# Patient Record
Sex: MALE | Race: WHITE | NOT HISPANIC OR LATINO
[De-identification: names, ages, dates, MRNs, and addresses within clinical notes are randomized per-mention and may not be internally consistent; named-entity substitution may affect disease eponyms.]

---

## 2019-12-03 ENCOUNTER — TRANSCRIPTION ENCOUNTER (OUTPATIENT)
Age: 74
End: 2019-12-03

## 2020-09-25 ENCOUNTER — APPOINTMENT (OUTPATIENT)
Dept: CARDIOLOGY | Facility: CLINIC | Age: 75
End: 2020-09-25
Payer: MEDICARE

## 2020-09-25 ENCOUNTER — NON-APPOINTMENT (OUTPATIENT)
Age: 75
End: 2020-09-25

## 2020-09-25 VITALS
WEIGHT: 234 LBS | OXYGEN SATURATION: 97 % | BODY MASS INDEX: 32.76 KG/M2 | HEART RATE: 70 BPM | SYSTOLIC BLOOD PRESSURE: 104 MMHG | DIASTOLIC BLOOD PRESSURE: 76 MMHG | HEIGHT: 71 IN

## 2020-09-25 PROCEDURE — 93000 ELECTROCARDIOGRAM COMPLETE: CPT

## 2020-09-25 PROCEDURE — 99204 OFFICE O/P NEW MOD 45 MIN: CPT

## 2020-09-25 NOTE — HISTORY OF PRESENT ILLNESS
[FreeTextEntry1] : Jeremie is 74 years old and well-known to me.  He underwent coronary bypass surgery many years ago for multivessel disease.  Even postoperatively he continued to develop exertional shortness of breath with intermittent edema of his lower extremities.  He had subsequent catheterization which revealed inferior wall hypokinesia with most of the grafts patent with some distal coronary disease.\par \par Overall he is functioning well with chronic exertional shortness of breath at approximately 1 block but this is not changed for over 15 years.  He has no chest pain palpitations dizziness or syncope\par \par He does have a heart murmur compatible with mild aortic valve disease on last echo performed about 6 months ago\par \par He was recently diagnosed with polycythemia vera and has had phlebotomy and subsequently was placed on hydroxyurea.  His last hematocrit was 45.  \par \par Recent blood tests cholesterol 105 HDL 29 LDL 59 glucose 110 GFR 67 normal liver function tests

## 2020-09-25 NOTE — REASON FOR VISIT
[FreeTextEntry1] : Jeremie Tommy now 74 years old status post coronary bypass surgery chronic edema and chronic exertional shortness of breath

## 2020-09-25 NOTE — PHYSICAL EXAM
[Normal Conjunctiva] : the conjunctiva exhibited no abnormalities [Heart Sounds] : normal S1 and S2 [Respiration, Rhythm And Depth] : normal respiratory rhythm and effort [Auscultation Breath Sounds / Voice Sounds] : lungs were clear to auscultation bilaterally [Abdomen Soft] : soft [Abdomen Tenderness] : non-tender [FreeTextEntry1] : Today trace edema the lower extremities

## 2020-09-25 NOTE — DISCUSSION/SUMMARY
[FreeTextEntry1] : Jeremie is overall stable and should continue on his present regimen of medications.  He remains overweight and has gained weight during the coronavirus epidemic.  I encouraged him to decrease his caloric intake and try to exercise as much as he can.  I believe with weight loss he shortness of breath will improve\par \par At this point no further cardiac work-up is needed\par He will follow-up with me again in 3 months and in 6 months we will repeat an echocardiogram\par I will obtain the records from [facility]

## 2020-09-25 NOTE — ASSESSMENT
[FreeTextEntry1] : Jeremie he has a long history of chronic ischemic heart disease status post bypass surgery, exertional shortness of breath with mild degrees of exertion even after the bypass which has not changed for many years.  He has some element of diastolic dysfunction inferior wall hypokinesia and mild aortic stenosis on his last echocardiogram several months ago at [facility]\par \par His lipid profile is in good order\par \par 1.  Chronic ischemic heart disease status post bypass surgery exertional shortness of breath stable and unchanged\par 2.  Chronic edema of the lower extremities probably related to some degree of diastolic dysfunction on Lasix\par 3.  Polycythemia vera new diagnosis on hydroxyurea followed by Dr. Baum\par 4.  Hyperlipidemia LDL way below 70 on small dose of statin

## 2020-09-25 NOTE — REVIEW OF SYSTEMS
[Fever] : no fever [Headache] : no headache [Chills] : no chills [Shortness Of Breath] : shortness of breath [Dyspnea on exertion] : dyspnea during exertion [Chest  Pressure] : no chest pressure [Chest Pain] : no chest pain [Leg Claudication] : no intermittent leg claudication [Palpitations] : no palpitations [Cough] : no cough [Wheezing] : no wheezing [Abdominal Pain] : no abdominal pain [Heartburn] : heartburn [Dysphagia] : no dysphagia [Urinary Frequency] : urinary frequency [Joint Pain] : no joint pain [Dizziness] : no dizziness [Tremor] : no tremor was seen

## 2021-01-07 ENCOUNTER — APPOINTMENT (OUTPATIENT)
Dept: CARDIOLOGY | Facility: CLINIC | Age: 76
End: 2021-01-07
Payer: MEDICARE

## 2021-01-07 VITALS — OXYGEN SATURATION: 96 % | DIASTOLIC BLOOD PRESSURE: 68 MMHG | HEART RATE: 68 BPM | SYSTOLIC BLOOD PRESSURE: 109 MMHG

## 2021-01-07 PROCEDURE — 99072 ADDL SUPL MATRL&STAF TM PHE: CPT

## 2021-01-07 PROCEDURE — 99214 OFFICE O/P EST MOD 30 MIN: CPT

## 2021-01-07 RX ORDER — CHOLECALCIFEROL (VITAMIN D3) 10(400)/ML
5000 DROPS ORAL
Refills: 0 | Status: DISCONTINUED | COMMUNITY
End: 2021-01-07

## 2021-01-07 RX ORDER — ATENOLOL 25 MG/1
25 TABLET ORAL
Refills: 0 | Status: DISCONTINUED | COMMUNITY
End: 2021-01-07

## 2021-01-07 NOTE — PHYSICAL EXAM
[Normal Conjunctiva] : the conjunctiva exhibited no abnormalities [Respiration, Rhythm And Depth] : normal respiratory rhythm and effort [Auscultation Breath Sounds / Voice Sounds] : lungs were clear to auscultation bilaterally [Heart Sounds] : normal S1 and S2 [Abdomen Soft] : soft [Abdomen Tenderness] : non-tender [FreeTextEntry1] : Today trace edema the lower extremities

## 2021-01-07 NOTE — ASSESSMENT
[FreeTextEntry1] : Jeremie he has a long history of chronic ischemic heart disease status post bypass surgery, exertional shortness of breath with mild degrees of exertion even after the bypass which has not changed for many years.  He has some element of diastolic dysfunction inferior wall hypokinesia and mild aortic stenosis on his last echocardiogram several months ago at [facility]\par \par His lipid profile is in good order\par \par 1.  Chronic ischemic heart disease status post bypass surgery exertional shortness of breath and exertional chest pressure somewhat increased from last visit\par 2.  Chronic edema of the lower extremities probably related to some degree of diastolic dysfunction on Lasix\par 3.  Polycythemia vera new diagnosis on hydroxyurea followed by Dr. Baum\par 4.  Hyperlipidemia LDL way below 70 on small dose of statin\par \par The patient's symptoms seem to have worsened somewhat although clinically he appears stable

## 2021-01-07 NOTE — HISTORY OF PRESENT ILLNESS
[FreeTextEntry1] : Jeremie is 74 years old and well-known to me.  He underwent coronary bypass surgery many years ago for multivessel disease.  Even postoperatively he continued to develop exertional shortness of breath with intermittent edema of his lower extremities.  He had subsequent catheterization which revealed inferior wall hypokinesia with most of the grafts patent with some distal coronary disease.\par \par Overall he is functioning well with chronic exertional shortness of breath at approximately 1 block but this is not changed for over 15 years.  He has no chest pain palpitations dizziness or syncope\par \par He does have a heart murmur compatible with mild aortic valve disease on last echo performed about 6 months ago\par \par He was recently diagnosed with polycythemia vera and has had phlebotomy and subsequently was placed on hydroxyurea.  His last hematocrit was 45.  \par \par Recent blood tests cholesterol 105 HDL 29 LDL 59 glucose 110 GFR 67 normal liver function tests\par \par AG today is stable and unchanged\par Echo from 2019 revealed mild to moderate aortic stenosis with overall preserved LV function with inferior hypokinesia.

## 2021-01-07 NOTE — DISCUSSION/SUMMARY
[FreeTextEntry1] : Jeremie is overall stable and should continue on his present regimen of medications.  He remains overweight and has gained weight during the coronavirus epidemic.  I encouraged him to decrease his caloric intake and try to exercise as much as he can.  I believe with weight loss he shortness of breath will improve.  I did tell him to increase his Lasix to 40 twice daily and continue his other medications.  He does feel that Ranexa has helped somewhat in his exertional angina.  He will also continue Imdur.\par \par Routine follow with me again in 3 months\par \par

## 2021-01-07 NOTE — REASON FOR VISIT
[FreeTextEntry1] : Jeremie Sanders 75 years old was seen in follow-up cardiac consultation for evaluation of continued exertional shortness of breath edema exertional chest pressure.

## 2021-01-11 RX ORDER — POTASSIUM CHLORIDE 750 MG/1
10 TABLET, FILM COATED, EXTENDED RELEASE ORAL DAILY
Qty: 90 | Refills: 3 | Status: ACTIVE | COMMUNITY
Start: 1900-01-01 | End: 1900-01-01

## 2021-01-13 DIAGNOSIS — R42 DIZZINESS AND GIDDINESS: ICD-10-CM

## 2021-01-28 ENCOUNTER — APPOINTMENT (OUTPATIENT)
Dept: CARDIOLOGY | Facility: CLINIC | Age: 76
End: 2021-01-28
Payer: MEDICARE

## 2021-01-28 PROCEDURE — 99072 ADDL SUPL MATRL&STAF TM PHE: CPT

## 2021-01-28 PROCEDURE — 93306 TTE W/DOPPLER COMPLETE: CPT

## 2021-05-21 ENCOUNTER — NON-APPOINTMENT (OUTPATIENT)
Age: 76
End: 2021-05-21

## 2021-05-21 ENCOUNTER — APPOINTMENT (OUTPATIENT)
Dept: CARDIOLOGY | Facility: CLINIC | Age: 76
End: 2021-05-21
Payer: MEDICARE

## 2021-05-21 VITALS
DIASTOLIC BLOOD PRESSURE: 72 MMHG | HEART RATE: 72 BPM | TEMPERATURE: 97.9 F | WEIGHT: 234 LBS | BODY MASS INDEX: 32.76 KG/M2 | RESPIRATION RATE: 17 BRPM | OXYGEN SATURATION: 97 % | HEIGHT: 71 IN | SYSTOLIC BLOOD PRESSURE: 107 MMHG

## 2021-05-21 PROCEDURE — 93000 ELECTROCARDIOGRAM COMPLETE: CPT

## 2021-05-21 PROCEDURE — 99214 OFFICE O/P EST MOD 30 MIN: CPT

## 2021-05-21 PROCEDURE — 99072 ADDL SUPL MATRL&STAF TM PHE: CPT

## 2021-05-21 NOTE — HISTORY OF PRESENT ILLNESS
[FreeTextEntry1] : Jeremie is 74 years old and well-known to me.  He underwent coronary bypass surgery many years ago for multivessel disease.  Even postoperatively he continued to develop exertional shortness of breath with intermittent edema of his lower extremities.  He had subsequent catheterization which revealed inferior wall hypokinesia with most of the grafts patent with some distal coronary disease.\par \par Overall he is functioning well with chronic exertional shortness of breath at approximately 1 block but this is not changed for over 15 years.  He has no chest pain palpitations dizziness or syncope\par \par He does have a heart murmur compatible with mild aortic valve disease on last echo performed about 6 months ago\par \par He was recently diagnosed with polycythemia vera and has had phlebotomy and subsequently was placed on hydroxyurea.  His last hematocrit was 45.  \par \par Recent blood tests cholesterol 105 HDL 29 LDL 59 glucose 110 GFR 67 normal liver function tests\par \par Echo from 2019 revealed mild to moderate aortic stenosis with overall preserved LV function with inferior hypokinesia.\par \par Repeat echo 2021 March revealed low normal left ventricular function moderate aortic stenosis no pulmonary hypertension\par \par Since the last visit, he continues with exertional chest pressure and shortness of breath.  This occurs about 1-2 blocks and may be slightly less and in the past.  He has had an extensive pulmonary and repeat cardiac work-up.  He does have some distal occlusion from his bypasses but his symptoms have remained stable.  He remains overweight\par \par Recent blood work\par Creatinine 0.93 potassium 3.9 normal liver function\par Cholesterol 91 HDL 21 LDL 46 TSH 2.83\par WBC 8.5 hemoglobin 15.0 adequate 43.5 sed rate 10 hemoglobin A1c 5.5

## 2021-05-21 NOTE — REASON FOR VISIT
[FreeTextEntry1] : Jeremie Sanders 75 years old here for follow-up of his cardiac status.  He continues to have exertional shortness of breath and chest pressure with 1-2 blocks which has been present now for several years without significant change

## 2021-05-21 NOTE — ASSESSMENT
[FreeTextEntry1] : Jeremie he has a long history of chronic ischemic heart disease status post bypass surgery, exertional shortness of breath with mild degrees of exertion even after the bypass which has not changed for many years.  He has some element of diastolic dysfunction inferior wall hypokinesia and mild aortic stenosis on his last echocardiogram several months ago at [facility].  Recent echo reveals mild to moderate aortic stenosis with mild segmental LV dysfunction overall preserved ejection fraction and no pulmonary hypertension\par \par His lipid profile is in good order.  Blood tests remained stable\par \par 1.  Chronic ischemic heart disease status post bypass surgery exertional shortness of breath and exertional chest pressure somewhat increased from last visit\par 2.  Chronic edema of the lower extremities probably related to some degree of diastolic dysfunction on Lasix\par 3.  Polycythemia vera new diagnosis on hydroxyurea followed by Dr. Baum\par 4.  Hyperlipidemia LDL way below 70 on small dose of statin\par 5.  Mild to moderate aortic stenosis with mild segmental LV dysfunction\par \par The patient continues with exertional shortness of breath and chest pressure.  I was able to walk him back and forth several times and did complain of some pressure and shortness of breath.  I am not certain what the etiology is but it could be related to small vessel disease or occlusion of small vessels distal disease, some diastolic dysfunction and related to his weight.  Overall does not been any major change in his symptoms now for several years

## 2021-05-21 NOTE — DISCUSSION/SUMMARY
[FreeTextEntry1] : Patient will continue on her present regimen of medications.  I encouraged him to try to be as active as possible and try as best he can to decrease his caloric intake.  He will be going to a gym and go on the treadmill which I encouraged as well as swimming.  These things may help to improve his overall exercise tolerance\par \par Routine follow with me again in 3 months

## 2021-09-10 ENCOUNTER — RX RENEWAL (OUTPATIENT)
Age: 76
End: 2021-09-10

## 2021-09-24 ENCOUNTER — NON-APPOINTMENT (OUTPATIENT)
Age: 76
End: 2021-09-24

## 2021-09-24 ENCOUNTER — APPOINTMENT (OUTPATIENT)
Dept: CARDIOLOGY | Facility: CLINIC | Age: 76
End: 2021-09-24
Payer: MEDICARE

## 2021-09-24 VITALS
HEART RATE: 74 BPM | HEIGHT: 60 IN | WEIGHT: 228 LBS | DIASTOLIC BLOOD PRESSURE: 81 MMHG | OXYGEN SATURATION: 95 % | BODY MASS INDEX: 44.76 KG/M2 | RESPIRATION RATE: 17 BRPM | SYSTOLIC BLOOD PRESSURE: 137 MMHG

## 2021-09-24 PROCEDURE — 93000 ELECTROCARDIOGRAM COMPLETE: CPT

## 2021-09-24 PROCEDURE — 99214 OFFICE O/P EST MOD 30 MIN: CPT

## 2021-09-24 NOTE — ASSESSMENT
[FreeTextEntry1] : Jeremie he has a long history of chronic ischemic heart disease status post bypass surgery, exertional shortness of breath with mild degrees of exertion even after the bypass which has not changed for many years.  He has some element of diastolic dysfunction inferior wall hypokinesia and mild to moderate aortic stenosis on his last echocardiogram several months.\par His lipid profile is in good order.  Blood tests remained stable.  He has a new left bundle branch block\par \par 1.  Chronic ischemic heart disease status post bypass surgery exertional shortness of breath and exertional chest pressure somewhat increased from last visit\par 2.  Chronic edema of the lower extremities probably related to some degree of diastolic dysfunction on Lasix\par 3.  Polycythemia vera new diagnosis on hydroxyurea followed by Dr. Baum\par 4.  Hyperlipidemia LDL way below 70 on small dose of statin\par 5.   moderate aortic stenosis with mild segmental LV dysfunction\par 6.  New left bundle branch block with first-degree AV block etiology unclear could be related to progressive aortic stenosis or progressive coronary disease\par \par

## 2021-09-24 NOTE — DISCUSSION/SUMMARY
[FreeTextEntry1] : 1.  2D echo to reevaluate LV and RV function and the severity of the aortic stenosis this is been scheduled for early next week at [facility]\par \par 2.  Pending on the results, we may need to consider repeat cardiac catheterization\par \par 3.  To further monitor his heart rate, I suggested a Zio patch for 10 days.  He may need referral to electrophysiology.\par \par Presently his symptoms are unchanged despite the new left bundle branch block

## 2021-09-24 NOTE — HISTORY OF PRESENT ILLNESS
[FreeTextEntry1] : Jeremie is 74 years old and well-known to me.  He underwent coronary bypass surgery many years ago for multivessel disease.  Even postoperatively he continued to develop exertional shortness of breath with intermittent edema of his lower extremities.  He had subsequent catheterization which revealed inferior wall hypokinesia with most of the grafts patent with some distal coronary disease.\par \par Overall he is functioning well with chronic exertional shortness of breath at approximately 1 block but this is not changed for over 15 years.  He has no chest pain palpitations dizziness or syncope\par \par He does have a heart murmur compatible with mild aortic valve disease on last echo performed about 6 months ago\par \par He was recently diagnosed with polycythemia vera and has had phlebotomy and subsequently was placed on hydroxyurea.  His last hematocrit was 45.  \par \par blood tests last visit cholesterol 105 HDL 29 LDL 59 glucose 110 GFR 67 normal liver function tests\par \par Echo from 2019 revealed mild to moderate aortic stenosis with overall preserved LV function with inferior hypokinesia.\par \par Repeat echo 2021 January revealed low normal left ventricular function moderate aortic stenosis no pulmonary hypertension, valve area is calculated at 1.4 cm²\par \par Since the last visit, he continues with exertional chest pressure and shortness of breath.  This occurs about 1-2 blocks and may be slightly less and in the past.  He has had an extensive pulmonary and repeat cardiac work-up.  He does have some distal occlusion from his bypasses but his symptoms have remained stable.  He remains overweight\par \par Since last visit his symptoms have not changed.  He has some intermittent dizziness which is vertigo in nature.  He has exertional chest pain and exertional shortness of breath with mild degrees of exertion 1-2 blocks which has been present for many years\par \par EKG today and in his internist office however reveals normal sinus rhythm first-degree AV block with a left bundle branch block.  The left bundle branch block is noted\par \par Blood tests are unremarkable creatinine 1.12 normal liver function tests potassium 3.9 hemoglobin 15.1 hematocrit 45, HDL 25 LDL 69 total cholesterol 110\par \par \par Most recent blood work\par Creatinine 0.93 potassium 3.9 normal liver function\par Cholesterol 91 HDL 21 LDL 46 TSH 2.83\par WBC 8.5 hemoglobin 15.0 adequate 43.5 sed rate 10 hemoglobin A1c 5.5\par \par

## 2021-09-30 ENCOUNTER — OUTPATIENT (OUTPATIENT)
Dept: OUTPATIENT SERVICES | Facility: HOSPITAL | Age: 76
LOS: 1 days | End: 2021-09-30
Payer: MEDICARE

## 2021-09-30 DIAGNOSIS — Z98.89 OTHER SPECIFIED POSTPROCEDURAL STATES: Chronic | ICD-10-CM

## 2021-09-30 DIAGNOSIS — Z95.1 PRESENCE OF AORTOCORONARY BYPASS GRAFT: Chronic | ICD-10-CM

## 2021-09-30 DIAGNOSIS — I35.0 NONRHEUMATIC AORTIC (VALVE) STENOSIS: ICD-10-CM

## 2021-09-30 PROCEDURE — 93306 TTE W/DOPPLER COMPLETE: CPT | Mod: 26

## 2021-09-30 PROCEDURE — C8929: CPT

## 2021-10-04 ENCOUNTER — APPOINTMENT (OUTPATIENT)
Dept: CARDIOLOGY | Facility: CLINIC | Age: 76
End: 2021-10-04
Payer: MEDICARE

## 2021-10-04 PROCEDURE — 93242 EXT ECG>48HR<7D RECORDING: CPT

## 2021-10-07 DIAGNOSIS — K80.20 CALCULUS OF GALLBLADDER W/OUT CHOLECYSTITIS W/OUT OBSTRUCTION: ICD-10-CM

## 2021-10-08 ENCOUNTER — OUTPATIENT (OUTPATIENT)
Dept: OUTPATIENT SERVICES | Facility: HOSPITAL | Age: 76
LOS: 1 days | End: 2021-10-08
Payer: MEDICARE

## 2021-10-08 DIAGNOSIS — Z98.89 OTHER SPECIFIED POSTPROCEDURAL STATES: Chronic | ICD-10-CM

## 2021-10-08 DIAGNOSIS — Z11.52 ENCOUNTER FOR SCREENING FOR COVID-19: ICD-10-CM

## 2021-10-08 DIAGNOSIS — Z95.1 PRESENCE OF AORTOCORONARY BYPASS GRAFT: Chronic | ICD-10-CM

## 2021-10-08 LAB — SARS-COV-2 RNA SPEC QL NAA+PROBE: SIGNIFICANT CHANGE UP

## 2021-10-08 PROCEDURE — U0003: CPT

## 2021-10-08 PROCEDURE — C9803: CPT

## 2021-10-08 PROCEDURE — U0005: CPT

## 2021-10-11 ENCOUNTER — OUTPATIENT (OUTPATIENT)
Dept: OUTPATIENT SERVICES | Facility: HOSPITAL | Age: 76
LOS: 1 days | End: 2021-10-11
Payer: MEDICARE

## 2021-10-11 VITALS — HEIGHT: 71 IN | WEIGHT: 220.02 LBS

## 2021-10-11 VITALS
RESPIRATION RATE: 14 BRPM | DIASTOLIC BLOOD PRESSURE: 60 MMHG | HEART RATE: 70 BPM | OXYGEN SATURATION: 95 % | SYSTOLIC BLOOD PRESSURE: 117 MMHG

## 2021-10-11 DIAGNOSIS — Z98.89 OTHER SPECIFIED POSTPROCEDURAL STATES: Chronic | ICD-10-CM

## 2021-10-11 DIAGNOSIS — I35.0 NONRHEUMATIC AORTIC (VALVE) STENOSIS: ICD-10-CM

## 2021-10-11 DIAGNOSIS — Z95.1 PRESENCE OF AORTOCORONARY BYPASS GRAFT: Chronic | ICD-10-CM

## 2021-10-11 LAB
ANION GAP SERPL CALC-SCNC: 14 MMOL/L — SIGNIFICANT CHANGE UP (ref 5–17)
BUN SERPL-MCNC: 17 MG/DL — SIGNIFICANT CHANGE UP (ref 7–23)
CALCIUM SERPL-MCNC: 10.2 MG/DL — SIGNIFICANT CHANGE UP (ref 8.4–10.5)
CHLORIDE SERPL-SCNC: 104 MMOL/L — SIGNIFICANT CHANGE UP (ref 96–108)
CO2 SERPL-SCNC: 22 MMOL/L — SIGNIFICANT CHANGE UP (ref 22–31)
CREAT SERPL-MCNC: 0.91 MG/DL — SIGNIFICANT CHANGE UP (ref 0.5–1.3)
GLUCOSE SERPL-MCNC: 128 MG/DL — HIGH (ref 70–99)
HCT VFR BLD CALC: 42.1 % — SIGNIFICANT CHANGE UP (ref 39–50)
HGB BLD-MCNC: 14.1 G/DL — SIGNIFICANT CHANGE UP (ref 13–17)
MCHC RBC-ENTMCNC: 33.2 PG — SIGNIFICANT CHANGE UP (ref 27–34)
MCHC RBC-ENTMCNC: 33.5 GM/DL — SIGNIFICANT CHANGE UP (ref 32–36)
MCV RBC AUTO: 99.1 FL — SIGNIFICANT CHANGE UP (ref 80–100)
NRBC # BLD: 0 /100 WBCS — SIGNIFICANT CHANGE UP (ref 0–0)
PLATELET # BLD AUTO: 323 K/UL — SIGNIFICANT CHANGE UP (ref 150–400)
POTASSIUM SERPL-MCNC: 4.4 MMOL/L — SIGNIFICANT CHANGE UP (ref 3.5–5.3)
POTASSIUM SERPL-SCNC: 4.4 MMOL/L — SIGNIFICANT CHANGE UP (ref 3.5–5.3)
RBC # BLD: 4.25 M/UL — SIGNIFICANT CHANGE UP (ref 4.2–5.8)
RBC # FLD: 13.8 % — SIGNIFICANT CHANGE UP (ref 10.3–14.5)
SODIUM SERPL-SCNC: 140 MMOL/L — SIGNIFICANT CHANGE UP (ref 135–145)
WBC # BLD: 11.59 K/UL — HIGH (ref 3.8–10.5)
WBC # FLD AUTO: 11.59 K/UL — HIGH (ref 3.8–10.5)

## 2021-10-11 PROCEDURE — 99152 MOD SED SAME PHYS/QHP 5/>YRS: CPT

## 2021-10-11 PROCEDURE — 93010 ELECTROCARDIOGRAM REPORT: CPT

## 2021-10-11 PROCEDURE — 93461 R&L HRT ART/VENTRICLE ANGIO: CPT

## 2021-10-11 PROCEDURE — 80048 BASIC METABOLIC PNL TOTAL CA: CPT

## 2021-10-11 PROCEDURE — 93005 ELECTROCARDIOGRAM TRACING: CPT

## 2021-10-11 PROCEDURE — 85027 COMPLETE CBC AUTOMATED: CPT

## 2021-10-11 PROCEDURE — 93461 R&L HRT ART/VENTRICLE ANGIO: CPT | Mod: 26

## 2021-10-11 PROCEDURE — 99153 MOD SED SAME PHYS/QHP EA: CPT

## 2021-10-11 PROCEDURE — C1887: CPT

## 2021-10-11 PROCEDURE — C1769: CPT

## 2021-10-11 PROCEDURE — 36415 COLL VENOUS BLD VENIPUNCTURE: CPT

## 2021-10-11 PROCEDURE — C1894: CPT

## 2021-10-11 PROCEDURE — C1889: CPT

## 2021-10-11 RX ORDER — SODIUM CHLORIDE 9 MG/ML
3 INJECTION INTRAMUSCULAR; INTRAVENOUS; SUBCUTANEOUS EVERY 8 HOURS
Refills: 0 | Status: DISCONTINUED | OUTPATIENT
Start: 2021-10-11 | End: 2021-10-25

## 2021-10-11 RX ORDER — FUROSEMIDE 40 MG
1 TABLET ORAL
Qty: 0 | Refills: 0 | DISCHARGE

## 2021-10-11 NOTE — H&P CARDIOLOGY - NSICDXPASTMEDICALHX_GEN_ALL_CORE_FT
PAST MEDICAL HISTORY:  BPH (benign prostatic hypertrophy)     CAD (coronary artery disease)     HLD (hyperlipidemia)     HTN (hypertension)     Obesity     Peripheral edema

## 2021-10-11 NOTE — H&P CARDIOLOGY - HISTORY OF PRESENT ILLNESS
74 y/o male with PMH of HTN, HLD, CAD s/p CABG x 6 vessels (2001) with [facility] 5/2014 with LIMA to LAD (patent) and SVG to D1 (mild luminal irregularities) and SVG to RCA; dCX and D2 all occluded, Aortic Stenosis, BPH, Polycythemia Vera (had phlebotomy x 1 unable to tolerate) on Hydroxurea (managed by Dr. Baum with goal Hct <45 followed by Dr. Tellez, Cardiologist noted to have new LBBB on ECG with known first degree AVB and continues with his chronic dyspnea.  He states it affects him after walking 1 block and has a dry cough and relates it to allergies of which he takes claritin for.  His recent echo on 9/30/21 revealed LVEF 50-55% with moderate AS and basal inferior and inferolateral walls that are severely hypokinetic.  His AS has progressed from mild to moderate.  He presents today for Cardiac catheterization and is s/p COVID vaccine x 2 and his PCR is negative on 10/8/21.  He denies cp, sob presently at rest or palpitations and has dry cough with exertion and at rest but denies sick contacts, fevers or chills.

## 2021-10-11 NOTE — H&P CARDIOLOGY - NSICDXFAMILYHX_GEN_ALL_CORE_FT
FAMILY HISTORY:  Father  Still living? No  Family history of renal cancer, Age at diagnosis: Age Unknown    Mother  Still living? No  Acute myocardial infarction, Age at diagnosis: Age Unknown

## 2021-10-11 NOTE — H&P CARDIOLOGY - NSICDXPASTSURGICALHX_GEN_ALL_CORE_FT
PAST SURGICAL HISTORY:  S/P CABG x 6 8/15/2001    S/P hernia surgery inguinal hernia  repair at 10 yrs of age    S/P tonsillectomy as a child

## 2021-10-11 NOTE — ASU DISCHARGE PLAN (ADULT/PEDIATRIC) - ASU DC SPECIAL INSTRUCTIONSFT
Wound Care:   the day AFTER your procedure remove bandage GENTTLY, and clean using  mild soap and gentle warm, water stream, pat dry. leave OPEN to air. YOU MAY SHOWER   DO NOT apply lotions, creams, ointments, powder, parfumes to your incision site  DO NOT SOAK your site for 1 week ( no baths, no pools, no tubs, etc...)  Check  your groin and /or wirst daily.A small amount of bruising, and soarness are normal    ACTIVITY: for 24 hours   - DO NOT DRIVE  - DO NOT make any important decisions or sign legal documents   - DO NOT operate heavy machinaries   - you may resume sexual activity in 48 hours, unless otherwise instructed by your cardiologist     If your procedure was done through the WRIST: for the NEXT 3DAYS:  - avoid pushing, pulling, with that affected wrist   - avoid repeated movement of that hand and wrist ( eg: typing, hammering)  - DO NOT LIFT anything more than 5 lbs     If your procedure was done through the GROIN: for the NEXT 5 DAYS  - Limit climbing stairs, DO NOT soak in bathtub or pool  - no strenous activities, pushing, pulling, straining  - Do not lift anything 10lbs or heavier     MEDICATION:   take your medications as explained ( see discharge paperwork)   Follow heart healthy diet reccomended by your doctor, , if you smoke STOP SMOKING ( may call [phone] for center of tobacco control if you need assistance)     CALL your doctor to make appointment in 2 WEEKS     ***CALL YOUR DOCTOR***  if you experience: fever, chills, body aches, or severe pain, swelling, redness, heat or yellow discharge at incision site  If you experience Bleeding or excruciating pain at the procedural site, sweliing ( golf ball size) at your procedural site  If you experience CHEST PAIN  If you experience extremity numbness, tingling, temperature change ( of your procedural site)   If you are unable to reach your doctor, you may contact:   -Cardiology Office at [facility] at [phone] or   - [facility] [phone]  - [facility] [phone]

## 2021-10-11 NOTE — ASU DISCHARGE PLAN (ADULT/PEDIATRIC) - CARE PROVIDER_API CALL
Jorge Tellez (MD)  Cardiovascular Disease; Internal Medicine; Interventional Cardiology  [address] [place] [address]  Phone: [phone]  Fax: [phone]  Follow Up Time:

## 2021-10-22 ENCOUNTER — APPOINTMENT (OUTPATIENT)
Dept: ELECTROPHYSIOLOGY | Facility: CLINIC | Age: 76
End: 2021-10-22
Payer: MEDICARE

## 2021-10-22 ENCOUNTER — NON-APPOINTMENT (OUTPATIENT)
Age: 76
End: 2021-10-22

## 2021-10-22 VITALS
DIASTOLIC BLOOD PRESSURE: 77 MMHG | HEART RATE: 75 BPM | BODY MASS INDEX: 30.8 KG/M2 | RESPIRATION RATE: 14 BRPM | WEIGHT: 220 LBS | OXYGEN SATURATION: 96 % | HEIGHT: 71 IN | SYSTOLIC BLOOD PRESSURE: 124 MMHG

## 2021-10-22 PROCEDURE — 93000 ELECTROCARDIOGRAM COMPLETE: CPT

## 2021-10-22 PROCEDURE — 99205 OFFICE O/P NEW HI 60 MIN: CPT

## 2021-10-22 RX ORDER — FINASTERIDE 5 MG/1
5 TABLET, FILM COATED ORAL
Refills: 0 | Status: DISCONTINUED | COMMUNITY
End: 2021-10-22

## 2021-10-22 RX ORDER — PANTOPRAZOLE 40 MG/1
40 TABLET, DELAYED RELEASE ORAL DAILY
Qty: 90 | Refills: 3 | Status: ACTIVE | COMMUNITY
Start: 2021-03-03

## 2021-10-22 RX ORDER — FUROSEMIDE 20 MG/1
20 TABLET ORAL
Qty: 90 | Refills: 3 | Status: DISCONTINUED | COMMUNITY
End: 2021-10-22

## 2021-10-22 RX ORDER — PANTOPRAZOLE 20 MG/1
20 TABLET, DELAYED RELEASE ORAL DAILY
Qty: 90 | Refills: 2 | Status: DISCONTINUED | COMMUNITY
End: 2021-10-22

## 2021-10-26 ENCOUNTER — APPOINTMENT (OUTPATIENT)
Dept: CARDIOLOGY | Facility: CLINIC | Age: 76
End: 2021-10-26

## 2021-10-29 DIAGNOSIS — Z01.818 ENCOUNTER FOR OTHER PREPROCEDURAL EXAMINATION: ICD-10-CM

## 2021-11-02 NOTE — PHYSICAL EXAM

## 2021-11-02 NOTE — HISTORY OF PRESENT ILLNESS
[FreeTextEntry1] : 76 year old man who presents with an increase in exertional dyspnea with a history of LBBB that developed between September of 2020 and September 2021 but after May 2021. No significant new CAD based on recent cath (prevous CABG). A ZIo monitor revealed LBBB with OR prolongation. ECG today shows LBBB with a OR of 260 ms. When he exercises his OR further lengthens which would explain his exertional dyspnea. Echo shows an LVEF of 50-55% (estimated) with significant dysynchrony and moderate AS (1.4 cm2) He also has a history of syncope more suggestive of dysautonomia that goes back several years.

## 2021-11-02 NOTE — REVIEW OF SYSTEMS
[Feeling Fatigued] : feeling fatigued [SOB] : shortness of breath [Dyspnea on exertion] : dyspnea during exertion [Syncope] : syncope [Negative] : Heme/Lymph [Chest Discomfort] : no chest discomfort [Palpitations] : no palpitations [Orthopnea] : no orthopnea [Anxiety] : anxiety [Under Stress] : under stress

## 2021-11-02 NOTE — DISCUSSION/SUMMARY
[FreeTextEntry1] : While discussion his situation, he became pale and diaphoretic and briefly lost consciousness. I put the exam table back propped up his legs and he regained consciousness. I was able to palpate a faint pulse at 60 bpm while he was unconscious. The appearance was classic for neurocardiogenic syncope and after he was back to baseline, he describes he has felt similar during phlebotomy and has had spontaneous episodes. He will be best served with a CRT pacemaker (possibly dual LBB) given the new LBBB with NV prolongation. Sarcoid is a consideration but less likely and the conduction system disease is likely due to aortic root calcification from AS. Will schedule him for pacemaker implant

## 2021-11-06 ENCOUNTER — OUTPATIENT (OUTPATIENT)
Dept: OUTPATIENT SERVICES | Facility: HOSPITAL | Age: 76
LOS: 1 days | End: 2021-11-06
Payer: MEDICARE

## 2021-11-06 DIAGNOSIS — Z98.89 OTHER SPECIFIED POSTPROCEDURAL STATES: Chronic | ICD-10-CM

## 2021-11-06 DIAGNOSIS — Z95.1 PRESENCE OF AORTOCORONARY BYPASS GRAFT: Chronic | ICD-10-CM

## 2021-11-06 DIAGNOSIS — Z11.52 ENCOUNTER FOR SCREENING FOR COVID-19: ICD-10-CM

## 2021-11-06 LAB — SARS-COV-2 RNA SPEC QL NAA+PROBE: SIGNIFICANT CHANGE UP

## 2021-11-06 PROCEDURE — C9803: CPT

## 2021-11-06 PROCEDURE — U0003: CPT

## 2021-11-06 PROCEDURE — U0005: CPT

## 2021-11-09 ENCOUNTER — OUTPATIENT (OUTPATIENT)
Dept: INPATIENT UNIT | Facility: HOSPITAL | Age: 76
LOS: 1 days | End: 2021-11-09
Payer: MEDICARE

## 2021-11-09 VITALS
TEMPERATURE: 98 F | HEART RATE: 66 BPM | SYSTOLIC BLOOD PRESSURE: 145 MMHG | DIASTOLIC BLOOD PRESSURE: 84 MMHG | RESPIRATION RATE: 18 BRPM | OXYGEN SATURATION: 99 %

## 2021-11-09 VITALS
RESPIRATION RATE: 18 BRPM | HEART RATE: 71 BPM | SYSTOLIC BLOOD PRESSURE: 104 MMHG | OXYGEN SATURATION: 97 % | DIASTOLIC BLOOD PRESSURE: 74 MMHG

## 2021-11-09 DIAGNOSIS — Z90.49 ACQUIRED ABSENCE OF OTHER SPECIFIED PARTS OF DIGESTIVE TRACT: Chronic | ICD-10-CM

## 2021-11-09 DIAGNOSIS — R55 SYNCOPE AND COLLAPSE: ICD-10-CM

## 2021-11-09 DIAGNOSIS — Z98.89 OTHER SPECIFIED POSTPROCEDURAL STATES: Chronic | ICD-10-CM

## 2021-11-09 DIAGNOSIS — Z95.1 PRESENCE OF AORTOCORONARY BYPASS GRAFT: Chronic | ICD-10-CM

## 2021-11-09 DIAGNOSIS — I44.7 LEFT BUNDLE-BRANCH BLOCK, UNSPECIFIED: ICD-10-CM

## 2021-11-09 LAB
ALBUMIN SERPL ELPH-MCNC: 4.9 G/DL — SIGNIFICANT CHANGE UP (ref 3.3–5)
ALP SERPL-CCNC: 72 U/L — SIGNIFICANT CHANGE UP (ref 40–120)
ALT FLD-CCNC: 32 U/L — SIGNIFICANT CHANGE UP (ref 10–45)
ANION GAP SERPL CALC-SCNC: 13 MMOL/L — SIGNIFICANT CHANGE UP (ref 5–17)
AST SERPL-CCNC: 24 U/L — SIGNIFICANT CHANGE UP (ref 10–40)
BILIRUB SERPL-MCNC: 1.5 MG/DL — HIGH (ref 0.2–1.2)
BUN SERPL-MCNC: 20 MG/DL — SIGNIFICANT CHANGE UP (ref 7–23)
CALCIUM SERPL-MCNC: 10.3 MG/DL — SIGNIFICANT CHANGE UP (ref 8.4–10.5)
CHLORIDE SERPL-SCNC: 101 MMOL/L — SIGNIFICANT CHANGE UP (ref 96–108)
CO2 SERPL-SCNC: 27 MMOL/L — SIGNIFICANT CHANGE UP (ref 22–31)
CREAT SERPL-MCNC: 1.02 MG/DL — SIGNIFICANT CHANGE UP (ref 0.5–1.3)
GLUCOSE SERPL-MCNC: 125 MG/DL — HIGH (ref 70–99)
HCT VFR BLD CALC: 47 % — SIGNIFICANT CHANGE UP (ref 39–50)
HGB BLD-MCNC: 15.7 G/DL — SIGNIFICANT CHANGE UP (ref 13–17)
INR BLD: 1.2 RATIO — HIGH (ref 0.88–1.16)
MAGNESIUM SERPL-MCNC: 2 MG/DL — SIGNIFICANT CHANGE UP (ref 1.6–2.6)
MCHC RBC-ENTMCNC: 33.4 GM/DL — SIGNIFICANT CHANGE UP (ref 32–36)
MCHC RBC-ENTMCNC: 33.7 PG — SIGNIFICANT CHANGE UP (ref 27–34)
MCV RBC AUTO: 100.9 FL — HIGH (ref 80–100)
NRBC # BLD: 0 /100 WBCS — SIGNIFICANT CHANGE UP (ref 0–0)
PLATELET # BLD AUTO: 217 K/UL — SIGNIFICANT CHANGE UP (ref 150–400)
POTASSIUM SERPL-MCNC: 3.6 MMOL/L — SIGNIFICANT CHANGE UP (ref 3.5–5.3)
POTASSIUM SERPL-SCNC: 3.6 MMOL/L — SIGNIFICANT CHANGE UP (ref 3.5–5.3)
PROT SERPL-MCNC: 7.3 G/DL — SIGNIFICANT CHANGE UP (ref 6–8.3)
PROTHROM AB SERPL-ACNC: 14.3 SEC — HIGH (ref 10.6–13.6)
RBC # BLD: 4.66 M/UL — SIGNIFICANT CHANGE UP (ref 4.2–5.8)
RBC # FLD: 14.6 % — HIGH (ref 10.3–14.5)
SODIUM SERPL-SCNC: 141 MMOL/L — SIGNIFICANT CHANGE UP (ref 135–145)
WBC # BLD: 7.19 K/UL — SIGNIFICANT CHANGE UP (ref 3.8–10.5)
WBC # FLD AUTO: 7.19 K/UL — SIGNIFICANT CHANGE UP (ref 3.8–10.5)

## 2021-11-09 PROCEDURE — 71045 X-RAY EXAM CHEST 1 VIEW: CPT | Mod: 26

## 2021-11-09 PROCEDURE — 93010 ELECTROCARDIOGRAM REPORT: CPT | Mod: 76,77

## 2021-11-09 RX ORDER — OXYCODONE AND ACETAMINOPHEN 5; 325 MG/1; MG/1
1 TABLET ORAL EVERY 6 HOURS
Refills: 0 | Status: DISCONTINUED | OUTPATIENT
Start: 2021-11-09 | End: 2021-11-09

## 2021-11-09 RX ORDER — ACETAMINOPHEN 500 MG
1000 TABLET ORAL ONCE
Refills: 0 | Status: DISCONTINUED | OUTPATIENT
Start: 2021-11-09 | End: 2021-11-09

## 2021-11-09 RX ORDER — ACETAMINOPHEN 500 MG
1000 TABLET ORAL ONCE
Refills: 0 | Status: COMPLETED | OUTPATIENT
Start: 2021-11-09 | End: 2021-11-09

## 2021-11-09 RX ORDER — ACETAMINOPHEN 500 MG
975 TABLET ORAL ONCE
Refills: 0 | Status: COMPLETED | OUTPATIENT
Start: 2021-11-09 | End: 2021-11-09

## 2021-11-09 RX ORDER — CEPHALEXIN 500 MG
1 CAPSULE ORAL
Qty: 9 | Refills: 0
Start: 2021-11-09 | End: 2021-11-11

## 2021-11-09 RX ORDER — CEPHALEXIN 500 MG
500 CAPSULE ORAL THREE TIMES A DAY
Refills: 0 | Status: DISCONTINUED | OUTPATIENT
Start: 2021-11-09 | End: 2021-11-09

## 2021-11-09 RX ADMIN — Medication 1000 MILLIGRAM(S): at 11:05

## 2021-11-09 RX ADMIN — Medication 975 MILLIGRAM(S): at 19:24

## 2021-11-09 RX ADMIN — Medication 400 MILLIGRAM(S): at 10:48

## 2021-11-09 NOTE — ASU DISCHARGE PLAN (ADULT/PEDIATRIC) - ASU DC SPECIAL INSTRUCTIONSFT
WOUND CARE:  Do NOT scrub, rub, or pick at your incision site  AFTER 3 DAYS you may SHOWER  - use mild soap and gentle warm, water stream, pat dry  DO NOT apply lotions, creams, ointments, powder, perfumes to your incision site  DO NOT SOAK your site for 4-6 weeks ( no baths, no pools, no tubs, etc...)  wear loose clothing around site for 1-2 weeks  IF surgical tape was used DO NOT remove the strips, they will fall off after 7days, if glue was used, it will naturally fall off within 3 weeks  if staples were used, they will be removed in 7-10 days by your doctor    ACTIVITY:  for 2 weeks AFTER  your procedure  - DO NOT RAISE your arm above shoulder level ( on the same side of your incision)  for 4 weeks AFTER your procedure   - DO NOT LIFT anything 10 lbs or heavier ( on the side of your implant)   - certain activities may be limited longer, those that involve swinging your arm, and will be discussed with your EP doctor  DO NOT DRIVE until your EP Doctor or nurse practitioner/ physician assistant states it is safe to do so  A follow up appointment in 7-14 days will be arranged before your discharge    ID CARD:   you will receive an ID CARD and device company booklet   - please carry that card with you at all times    ***CALL YOUR DOCTOR ***  IF you have fever, chills, body aches, or severe pain, swelling, redness, heat, yellow drainage from your incision site  IF bleeding  or significant new swelling from your puncture site  IF your experience lightheadedness, dizziness, or fainting spell.  IF unable to ge tin contact with your doctor, you may call the Cardiology Office at [facility] at [phone]

## 2021-11-09 NOTE — H&P CARDIOLOGY - NSICDXPASTSURGICALHX_GEN_ALL_CORE_FT
PAST SURGICAL HISTORY:  S/P CABG x 6 8/15/2001    S/P cholecystectomy     S/P hernia surgery inguinal hernia  repair at 10 yrs of age    S/P tonsillectomy as a child

## 2021-11-09 NOTE — CHART NOTE - NSCHARTNOTEFT_GEN_A_CORE
11/9 s/p Metronic Dual chamber PPM; DDD , left chest wall  Site benign, VSS  Pt is alert and O x3,   Ancef 2Gm received in Lab  Plan:   CXR (p) stat,  EKG done  Keflex 500mg TID for 3 days   possible d/c home after seen by Dr. Delacruz prior to discharge

## 2021-11-09 NOTE — ASU DISCHARGE PLAN (ADULT/PEDIATRIC) - CARE PROVIDER_API CALL
Sugar Delacruz (MD; PhD)  Cardiac Electrophysiology; Cardiovascular Disease; Internal Medicine  [facility] - Dept of Cardiology, [address] [place] [address]  Phone: [phone]  Fax: [phone]  Scheduled Appointment: 11/19/2021 11:40 AM

## 2021-11-09 NOTE — H&P CARDIOLOGY - HISTORY OF PRESENT ILLNESS
77 yo M with PMhx congenital anomaly of heart, CABG,     No fever or chills ,  77 yo M with PMhx congenital anomaly of heart, CABG with increase in exertional dyspnea with a history of LBBB that developed between September 0f 2020 and September 2021 but after May 2021. No significant new CAD based on recent cath (previous CABG) . A Zlo monitor revealed LBBB with FL prolongation. FL further lengthens with exercise causing further exertional dyspnea. ECHO with EF 50-55% with significant dysynchrony and moderate AS . He has a hx of syncope suggestive of dysautonomia.      No fever or chills ,  77 yo M with PMhx congenital anomaly of heart, moderate AS  CABG with increase in exertional dyspnea with a history of LBBB that developed between September 0f 2020 and September 2021 but after May 2021. No significant new CAD based on recent cath (previous CABG) . A Zlo monitor revealed LBBB with KS prolongation. KS further lengthens with exercise causing further exertional dyspnea. ECHO with EF 50-55% with significant dysynchrony and moderate AS . He has a hx of syncope suggestive of dysautonomia.  He presents today for PPM implant. His last cardiac cath was on September 2021 .     Sept 2021 ECHO   1. Calcified trileaflet aortic valve with decreased  opening. Peak transaortic valve gradient equals 58 mm Hg,  mean transaortic valve gradient equals 30 mm Hg, estimated  aortic valve area equals 1.2 sqcm (by continuity equation),  aortic valve velocity time integral equals 88 cm, the DVI=  0.31, consistent with moderate aortic stenosis. Mild aortic  regurgitation.  2. Normal left ventricular size with mild concentric  hypertrophy.  3. There is paradoxical septal motion.  The basal inferior  and inferolateral walls are severely hypokinetic.  The left  ventricular function is overall preserved.  The LVEF=  50-55%.    No fever or chills , no N/V/D or abd pain.  77 yo M with PMhx HTN, HLD , moderate AS  CABG with increase in exertional dyspnea with a history of LBBB that developed between September 0f 2020 and September 2021 but after May 2021. No significant new CAD based on recent cath (previous CABG) . A Zlo monitor revealed LBBB with MT prolongation. MT further lengthens with exercise causing further exertional dyspnea. He also has occasional chest pressure with exertion.  ECHO with EF 50-55% with significant dysynchrony and moderate AS . He has a hx of syncope suggestive of dysautonomia.  He presents today for PPM implant. His last cardiac cath was on September 2021 .     Sept 2021 ECHO   1. Calcified trileaflet aortic valve with decreased  opening. Peak transaortic valve gradient equals 58 mm Hg,  mean transaortic valve gradient equals 30 mm Hg, estimated  aortic valve area equals 1.2 sqcm (by continuity equation),  aortic valve velocity time integral equals 88 cm, the DVI=  0.31, consistent with moderate aortic stenosis. Mild aortic  regurgitation.  2. Normal left ventricular size with mild concentric  hypertrophy.  3. There is paradoxical septal motion.  The basal inferior  and inferolateral walls are severely hypokinetic.  The left  ventricular function is overall preserved.  The LVEF=  50-55%.    No fever or chills , no N/V/D or abd pain.  77 yo M with PMhx HTN, HLD , polycythemia vera ,  moderate AS  CABG with increase in exertional dyspnea with a history of LBBB that developed between September 0f 2020 and September 2021 but after May 2021. No significant new CAD based on recent cath (previous CABG) . A Zlo monitor revealed LBBB with UT prolongation. UT further lengthens with exercise causing further exertional dyspnea. He also has occasional chest pressure with exertion.  ECHO with EF 50-55% with significant dysynchrony and moderate AS . He has a hx of syncope suggestive of dysautonomia.  He presents today for PPM implant. His last cardiac cath was on September 2021 .     Sept 2021 ECHO   1. Calcified trileaflet aortic valve with decreased  opening. Peak transaortic valve gradient equals 58 mm Hg,  mean transaortic valve gradient equals 30 mm Hg, estimated  aortic valve area equals 1.2 sqcm (by continuity equation),  aortic valve velocity time integral equals 88 cm, the DVI=  0.31, consistent with moderate aortic stenosis. Mild aortic  regurgitation.  2. Normal left ventricular size with mild concentric  hypertrophy.  3. There is paradoxical septal motion.  The basal inferior  and inferolateral walls are severely hypokinetic.  The left  ventricular function is overall preserved.  The LVEF=  50-55%.    No fever or chills , no N/V/D or abd pain.

## 2021-11-10 PROCEDURE — C1889: CPT

## 2021-11-10 PROCEDURE — C1769: CPT

## 2021-11-10 PROCEDURE — C1898: CPT

## 2021-11-10 PROCEDURE — C1785: CPT

## 2021-11-10 PROCEDURE — C1892: CPT

## 2021-11-10 PROCEDURE — 80053 COMPREHEN METABOLIC PANEL: CPT

## 2021-11-10 PROCEDURE — 83735 ASSAY OF MAGNESIUM: CPT

## 2021-11-10 PROCEDURE — 33208 INSRT HEART PM ATRIAL & VENT: CPT

## 2021-11-10 PROCEDURE — 85610 PROTHROMBIN TIME: CPT

## 2021-11-10 PROCEDURE — 93005 ELECTROCARDIOGRAM TRACING: CPT

## 2021-11-10 PROCEDURE — 71045 X-RAY EXAM CHEST 1 VIEW: CPT

## 2021-11-10 PROCEDURE — 85027 COMPLETE CBC AUTOMATED: CPT

## 2021-11-11 ENCOUNTER — RX RENEWAL (OUTPATIENT)
Age: 76
End: 2021-11-11

## 2021-11-18 ENCOUNTER — RESULT CHARGE (OUTPATIENT)
Age: 76
End: 2021-11-18

## 2021-11-19 ENCOUNTER — APPOINTMENT (OUTPATIENT)
Dept: ELECTROPHYSIOLOGY | Facility: CLINIC | Age: 76
End: 2021-11-19
Payer: MEDICARE

## 2021-11-19 ENCOUNTER — NON-APPOINTMENT (OUTPATIENT)
Age: 76
End: 2021-11-19

## 2021-11-19 VITALS
DIASTOLIC BLOOD PRESSURE: 79 MMHG | BODY MASS INDEX: 30.8 KG/M2 | WEIGHT: 220 LBS | SYSTOLIC BLOOD PRESSURE: 123 MMHG | OXYGEN SATURATION: 98 % | HEART RATE: 60 BPM | HEIGHT: 71 IN

## 2021-11-19 PROCEDURE — 93000 ELECTROCARDIOGRAM COMPLETE: CPT | Mod: 59

## 2021-11-19 PROCEDURE — 93280 PM DEVICE PROGR EVAL DUAL: CPT

## 2021-11-19 PROCEDURE — 99024 POSTOP FOLLOW-UP VISIT: CPT

## 2021-12-05 ENCOUNTER — RX RENEWAL (OUTPATIENT)
Age: 76
End: 2021-12-05

## 2021-12-15 ENCOUNTER — RX RENEWAL (OUTPATIENT)
Age: 76
End: 2021-12-15

## 2022-01-03 ENCOUNTER — APPOINTMENT (OUTPATIENT)
Dept: CARDIOLOGY | Facility: CLINIC | Age: 77
End: 2022-01-03
Payer: MEDICARE

## 2022-01-03 ENCOUNTER — NON-APPOINTMENT (OUTPATIENT)
Age: 77
End: 2022-01-03

## 2022-01-03 VITALS
SYSTOLIC BLOOD PRESSURE: 121 MMHG | HEIGHT: 71 IN | DIASTOLIC BLOOD PRESSURE: 68 MMHG | WEIGHT: 221 LBS | OXYGEN SATURATION: 97 % | HEART RATE: 62 BPM | BODY MASS INDEX: 30.94 KG/M2

## 2022-01-03 PROCEDURE — 93000 ELECTROCARDIOGRAM COMPLETE: CPT

## 2022-01-03 PROCEDURE — 99214 OFFICE O/P EST MOD 30 MIN: CPT

## 2022-01-03 NOTE — HISTORY OF PRESENT ILLNESS
[FreeTextEntry1] : Jeremie is 74 years old and well-known to me.  He underwent coronary bypass surgery many years ago for multivessel disease.  Even postoperatively he continued to develop exertional shortness of breath with intermittent edema of his lower extremities.  He had subsequent catheterization which revealed inferior wall hypokinesia with most of the grafts patent with some distal coronary disease.\par \par Overall he is functioning well with chronic exertional shortness of breath at approximately 1 block but this is not changed for over 15 years.  He has no chest pain palpitations dizziness or syncope\par \par He does have a heart murmur compatible with mild aortic valve disease on last echo performed about 6 months ago\par \par He was recently diagnosed with polycythemia vera and has had phlebotomy and subsequently was placed on hydroxyurea.  His last hematocrit was 45.  \par \par blood tests last visit cholesterol 105 HDL 29 LDL 59 glucose 110 GFR 67 normal liver function tests\par \par Echo from 2019 revealed mild to moderate aortic stenosis with overall preserved LV function with inferior hypokinesia.\par \par Repeat echo 2021 January revealed low normal left ventricular function moderate aortic stenosis no pulmonary hypertension, valve area is calculated at 1.4 cm²\par \par He continued with exertional chest pressure and shortness of breath.  This occurs about 1-2 blocks and may be slightly less and in the past.  He has had an extensive pulmonary and repeat cardiac work-up.  He does have some distal occlusion from his bypasses but his symptoms have remained stable.  He remains overweight\par On last visit his symptoms had not changed.  He has some intermittent dizziness which is vertigo in nature.  He has exertional chest pain and exertional shortness of breath with mild degrees of exertion 1-2 blocks which has been present for many years\par \par EKG his internist office however reveals normal sinus rhythm first-degree AV block with a left bundle branch block.  The left bundle branch block is noted\par \par Blood tests are unremarkable creatinine 1.12 normal liver function tests potassium 3.9 hemoglobin 15.1 hematocrit 45, HDL 25 LDL 69 total cholesterol 110\par \par \par Most recent blood work\par Creatinine 0.93 potassium 3.9 normal liver function\par Cholesterol 91 HDL 21 LDL 46 TSH 2.83\par WBC 8.5 hemoglobin 15.0 adequate 43.5 sed rate 10 hemoglobin A1c 5.5\par \par Subsequent cardiac work-up revealed no significant change in the echo with mild to moderate segmental LV dysfunction.  Subsequent cardiac catheterization revealed mild to moderate segmental LV dysfunction with moderate aortic stenosis patent LIMA to the mid LAD patent graft to the diagonal with other grafts occluded and collaterals to the distal circumflex and right coronary artery.\par \par Subsequent to this he underwent implantation of a permanent pacemaker.  Apparently insurance would not approve of a biventricular device.\par \par He also has polycythemia vera and follows with a hematologist and has blood withdrawn frequently (phlebotomy) and is on hydroxyurea\par \par Actually since the pacemaker has been inserted the patient feels his shortness of breath is markedly improved and he has less edema.  He denies chest pain.  He does have improved exercise tolerance\par \par However had a meeting with his hematologist she was told that his heart rate was slow which caused him to make a follow-up visit with me\par \par Actually his EKG today reveals 100% paced with underlying normal sinus rhythm probably first-degree AV block the heart rate is set at 60 beats a minute\par \par He does have some recurrent episodes of vertigo which are mild and he has a dry cough of unclear etiology without fever chills.  The cough is nonproductive.  He has an appoint with ENT to evaluate for possible gastroesophageal reflux disease\par \par

## 2022-01-03 NOTE — REASON FOR VISIT
[FreeTextEntry1] : Jeremie Sanders 75 years old here for follow-up of his cardiac status.  He recently developed a first-degree AV block left bundle branch block and had a subsequent pacemaker implanted at [facility].

## 2022-01-03 NOTE — DISCUSSION/SUMMARY
[FreeTextEntry1] : 1.  I reassured him that his cardiac status was stable\par \par 2.  He should continue on his present regimen of medications.\par \par 3.  I reassured her that his pacemaker was functioning normally and was set at a heart rate of 60\par \par 4.  Routine follow with me again in 3 months\par \par Overall he is clinically quite stable

## 2022-01-03 NOTE — PHYSICAL EXAM
[Normal Conjunctiva] : the conjunctiva exhibited no abnormalities [Respiration, Rhythm And Depth] : normal respiratory rhythm and effort [Auscultation Breath Sounds / Voice Sounds] : lungs were clear to auscultation bilaterally [Heart Sounds] : normal S1 and S2 [Abdomen Soft] : soft [Abdomen Tenderness] : non-tender [FreeTextEntry1] : No significant edema of the lower extremities today

## 2022-01-03 NOTE — ASSESSMENT
[FreeTextEntry1] : Thyroid seed status post bypass surgery close grafts to the circumflex and right with good collaterals with patent LIMA to the LAD and diagonal, mild to moderate aortic stenosis and mild to moderate LV dysfunction segmental in the inferior posterior wall.  He is status post pacemaker for advanced conduction disease.  Actually from a cardiac point of view he has improved since the pacemaker with less shortness of breath and less edema.  He does have a chronic dry cough.  Overall from a cardiac point of view he is extremely stable and actually improved from the last visit\par \par 1.  Status post bypass surgery patent LIMA to the LAD patent graft to the diagonal collaterals to the circumflex and right coronary artery\par \par 2.  Mild to moderate aortic stenosis stable\par \par 3.  Mild to moderate segmental LV dysfunction the inferior posterior wall\par \par 4.  Status post pacemaker for advanced conduction disease (left bundle branch block left axis deviation and first-degree AV block) pacemaker is functioning normally there is no bradycardia\par \par 5.  Recurrent episodes of vertigo improved\par \par 6.  Dry cough unclear etiology.  Lungs are clear there is no evidence of fluid overload

## 2022-01-08 ENCOUNTER — NON-APPOINTMENT (OUTPATIENT)
Age: 77
End: 2022-01-08

## 2022-01-21 ENCOUNTER — APPOINTMENT (OUTPATIENT)
Dept: OTOLARYNGOLOGY | Facility: CLINIC | Age: 77
End: 2022-01-21
Payer: MEDICARE

## 2022-01-21 VITALS
HEIGHT: 71 IN | BODY MASS INDEX: 30.94 KG/M2 | SYSTOLIC BLOOD PRESSURE: 117 MMHG | HEART RATE: 78 BPM | TEMPERATURE: 98.2 F | WEIGHT: 221 LBS | DIASTOLIC BLOOD PRESSURE: 73 MMHG

## 2022-01-21 DIAGNOSIS — R05.9 COUGH, UNSPECIFIED: ICD-10-CM

## 2022-01-21 DIAGNOSIS — K13.79 OTHER LESIONS OF ORAL MUCOSA: ICD-10-CM

## 2022-01-21 DIAGNOSIS — J34.2 DEVIATED NASAL SEPTUM: ICD-10-CM

## 2022-01-21 PROCEDURE — 31231 NASAL ENDOSCOPY DX: CPT

## 2022-01-21 PROCEDURE — 99204 OFFICE O/P NEW MOD 45 MIN: CPT | Mod: 25

## 2022-01-21 NOTE — PHYSICAL EXAM
[Nasal Endoscopy Performed] : nasal endoscopy was performed, see procedure section for findings [] : septum deviated to the right [Midline] : trachea located in midline position [Laryngoscopy Performed] : laryngoscopy was performed, see procedure section for findings [Normal] : no rashes [de-identified] : Elongated uvula

## 2022-01-21 NOTE — HISTORY OF PRESENT ILLNESS
[de-identified] : Patient complains of constant cough x 6 months. States its a constant dry non productive cough. He used to take Claritin and it would resolve the cough but now it doesn’t help him. He just started taking Robitussin which helps. Hes  had this cough for several years it was intermittent at that time, over the past 6 months its more constant. Also complains  of nasal congestion, used Flonase for several months and had no improvement. Denies tinnitus, hearing loss, vertigo. \par Hes seen several ENT doc in the past was only treated with Flonase.

## 2022-01-21 NOTE — ASSESSMENT
[FreeTextEntry1] : Patient long history of chronic dry cough.  Has underlying allergies has had this cough on and off for many years Claritin is worked in the past now nothing seems to be working he is seen for 5 other ENTs no help here for evaluation endoscopically terribly deviated septum his left nasal cavity is totally obstructed right nasal cavity is narrow fiberoptic evaluation of his nasal cavity revealed elongated uvula that is touching his epiglottis most likely the etiology of his cough I put him on a Medrol Dosepak Flonase nasal spray encouraged him to go see his allergist again to get retested for allergies and get better control of his allergies because that can also be a trigger.  He will follow-up and see us in 3 months.

## 2022-01-21 NOTE — REVIEW OF SYSTEMS
[Cough] : cough [Patient Intake Form Reviewed] : Patient intake form was reviewed [As Noted in HPI] : as noted in HPI [Negative] : Respiratory

## 2022-02-04 ENCOUNTER — NON-APPOINTMENT (OUTPATIENT)
Age: 77
End: 2022-02-04

## 2022-02-04 ENCOUNTER — APPOINTMENT (OUTPATIENT)
Dept: ELECTROPHYSIOLOGY | Facility: CLINIC | Age: 77
End: 2022-02-04
Payer: MEDICARE

## 2022-02-04 VITALS
HEART RATE: 73 BPM | BODY MASS INDEX: 30.8 KG/M2 | HEIGHT: 71 IN | WEIGHT: 220 LBS | DIASTOLIC BLOOD PRESSURE: 76 MMHG | SYSTOLIC BLOOD PRESSURE: 115 MMHG | OXYGEN SATURATION: 97 %

## 2022-02-04 PROCEDURE — 93280 PM DEVICE PROGR EVAL DUAL: CPT

## 2022-02-04 PROCEDURE — 93000 ELECTROCARDIOGRAM COMPLETE: CPT | Mod: 59

## 2022-02-15 NOTE — ASU PATIENT PROFILE, ADULT - AS SC BRADEN FRICTION
[FreeTextEntry1] : Ms. Veronica is a 45 year old female with a hx of HLD (on Lipitor), GERD, and Anxiety. 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 Pt presenting with multiple GI complaints, N/V/early satiety/daily heartburn/RUQ ab pain. Admits to excessive drinking on Friday only.\par \par 1. Liver lesion\par -MRI revealing liver lesion recommended f/u in 3 mns with MRI Eovist\par -Will obtain tumor markers with BW today\par \par 2. Fatty Liver\par -CT ab revealing fatty liver, will obtain a fibroscan to quantify steatosis/fibrosis\par -Risk factors include: Hx HLD, some risky alcohol use, BMI 26 \par - I have explained the best treatment for fatty liver is diet and exercise. I have encouraged a gradual weight loss of at least 10%. \par -I also advised fatty liver may develop into liver cancer with/without cirrhosis and therefore continued screening with labs and ab imaging is important. \par -I have encouraged a healthy lifestyle including exercising at least 3x times weekly and maintaining a diet low in carbohydrates, fat, sodium (<2gm daily) and cholesterol. \par -I have counseled pt on abstaining from alcohol and illicit drug use, avoid herbal and dietary supplements. Encouraged limit use of acetaminophen to <2gm per day and to avoid NSAIDS.    \par \par 3. HM\par -Itching: advised to try nonpharmacological remedies to assist with itching; pt to reach out to PCP regarding Derm consult.\par -HAV/HBV will check status\par - Covid vaccination completed via Pf[employer] 2021\par -GI complaints: ordered H pylori stool test. Advised to continue use of Pepcid as prescribed. Will have pt f/u with MD ALEJANDRE in regard as may benefit from EGD scoping. \par \par Plan:\par RTC 1 month with CK with Fibroscan\par BW ASAP\par MRI w/ Eovist repeat in 05/2022\par Reduction in alcohol use \par GI: H pylori stool test ASAP, continued H2 blocker use   (3) no apparent problem

## 2022-02-20 ENCOUNTER — RX RENEWAL (OUTPATIENT)
Age: 77
End: 2022-02-20

## 2022-02-23 ENCOUNTER — APPOINTMENT (OUTPATIENT)
Dept: ELECTROPHYSIOLOGY | Facility: CLINIC | Age: 77
End: 2022-02-23

## 2022-03-30 ENCOUNTER — APPOINTMENT (OUTPATIENT)
Dept: CARDIOLOGY | Facility: CLINIC | Age: 77
End: 2022-03-30
Payer: MEDICARE

## 2022-03-30 VITALS
HEART RATE: 68 BPM | HEIGHT: 71 IN | BODY MASS INDEX: 30.52 KG/M2 | DIASTOLIC BLOOD PRESSURE: 88 MMHG | WEIGHT: 218 LBS | SYSTOLIC BLOOD PRESSURE: 126 MMHG | OXYGEN SATURATION: 98 % | RESPIRATION RATE: 17 BRPM

## 2022-03-30 DIAGNOSIS — Z01.810 ENCOUNTER FOR PREPROCEDURAL CARDIOVASCULAR EXAMINATION: ICD-10-CM

## 2022-03-30 PROCEDURE — 99214 OFFICE O/P EST MOD 30 MIN: CPT

## 2022-03-30 NOTE — DISCUSSION/SUMMARY
[FreeTextEntry1] : Patient can lower his dose of Lasix from 80 mg a day to 40 as he has less edema and his shortness of breath is somewhat better\par \par Presently he is hemodynamically stable.\par There is no cardiac contraindication to the planned colonoscopy.  His pacemaker is functioning normally and his EKG is stable and normal sinus rhythm.

## 2022-03-30 NOTE — HISTORY OF PRESENT ILLNESS
[FreeTextEntry1] : Jeremie is 76 years old and well-known to me.  He underwent coronary bypass surgery many years ago for multivessel disease.  Even postoperatively he continued to develop exertional shortness of breath with intermittent edema of his lower extremities.  He had subsequent catheterization which revealed inferior wall hypokinesia with most of the grafts patent with some distal coronary disease.\par \par Overall he is functioning well with chronic exertional shortness of breath at approximately 1 block but this is not changed for over 15 years.  He has no chest pain palpitations dizziness or syncope\par \par He does have a heart murmur compatible with mild aortic valve disease on last echo performed about 6 months ago\par \par He was recently diagnosed with polycythemia vera and has had phlebotomy and subsequently was placed on hydroxyurea.  His last hematocrit was 45.  \par \par blood tests last visit cholesterol 105 HDL 29 LDL 59 glucose 110 GFR 67 normal liver function tests\par \par Echo from 2019 revealed mild to moderate aortic stenosis with overall preserved LV function with inferior hypokinesia.\par \par Repeat echo 2021 January revealed low normal left ventricular function moderate aortic stenosis no pulmonary hypertension, valve area is calculated at 1.4 cm²\par \par Patient underwent cardiac catheterization which again revealed patent LIMA to the LAD and diagonal with collaterals to the circumflex and right coronary.  This was similar to his prior catheterizations.  He had mild to moderate aortic stenosis with a mild degree of segmental LV dysfunction\par \par Overall since the pacemaker was inserted for the left bundle branch block and advanced conduction disease, he has been feeling well with less shortness of breath and has had in the past and less edema of his lower extremities.\par \par He is now preop for colonoscopy.  He remains hemodynamically stable\par \par He continued with exertional chest pressure and shortness of breath.  This occurs about 1-2 blocks and may be slightly less and in the past.  He has had an extensive pulmonary and repeat cardiac work-up.  He does have some distal occlusion from his bypasses but his symptoms have remained stable.  He remains overweight\par On last visit his symptoms had not changed.  He has some intermittent dizziness which is vertigo in nature.  He has exertional chest pain and exertional shortness of breath with mild degrees of exertion 1-2 blocks which has been present for many years\par \par EKG his internist office however reveals normal sinus rhythm first-degree AV block with a left bundle branch block.  The left bundle branch block i was new\par \par Blood tests are unremarkable creatinine 1.12 normal liver function tests potassium 3.9 hemoglobin 15.1 hematocrit 45, HDL 25 LDL 69 total cholesterol 110\par \par \par \par \par Most recent blood work\par Creatinine 0.93 potassium 3.9 normal liver function\par Cholesterol 91 HDL 21 LDL 46 TSH 2.83\par WBC 8.5 hemoglobin 15.0 adequate 43.5 sed rate 10 hemoglobin A1c 5.5\par \par Subsequent cardiac work-up revealed no significant change in the echo with mild to moderate segmental LV dysfunction.  Subsequent cardiac catheterization revealed mild to moderate segmental LV dysfunction with moderate aortic stenosis patent LIMA to the mid LAD patent graft to the diagonal with other grafts occluded and collaterals to the distal circumflex and right coronary artery.\par \par Subsequent to this he underwent implantation of a permanent pacemaker.  Apparently insurance would not approve of a biventricular device.\par \par He also has polycythemia vera and follows with a hematologist and has blood withdrawn frequently (phlebotomy) and is on hydroxyurea\par \par Actually since the pacemaker has been inserted the patient feels his shortness of breath is markedly improved and he has less edema.  He denies chest pain.  He does have improved exercise tolerance\par \par However had a meeting with his hematologist she was told that his heart rate was slow which caused him to make a follow-up visit with me\par \par Actually his EKG today reveals 100% paced with underlying normal sinus rhythm probably first-degree AV block the heart rate is set at 60 beats a minute\par \par He does have some recurrent episodes of vertigo which are mild and he has a dry cough of unclear etiology without fever chills.  The cough is nonproductive.  He has an appoint with ENT to evaluate for possible gastroesophageal reflux disease\par \par

## 2022-03-30 NOTE — ASSESSMENT
[FreeTextEntry1] : Jeremie he has a long history of chronic ischemic heart disease status post bypass surgery, exertional shortness of breath with mild degrees of exertion even after the bypass which has not changed for many years.  He has some element of diastolic dysfunction inferior wall hypokinesia and mild to moderate aortic stenosis on his last echocardiogram several months. \par \par \par 1.  Chronic ischemic heart disease status post bypass surgery exertional shortness of breath and exertional chest pressure somewhat increased from last visit\par 2.  Chronic edema of the lower extremities probably related to some degree of diastolic dysfunction on Lasix\par 3.  Polycythemia vera new diagnosis on hydroxyurea followed by Dr. Baum\par 4.  Hyperlipidemia LDL way below 70 on small dose of statin\par 5.   moderate aortic stenosis with mild segmental LV dysfunction\par 6.  Status post pacemaker for left bundle branch block.\par \par Overall since the last visit the patient has improved since the pacemaker though still has some exertional shortness of breath but his edema is less.\par

## 2022-03-30 NOTE — REASON FOR VISIT
[FreeTextEntry1] : Jeremie Sanders 76 years old here for follow-up of his cardiac status.  He recently developed a first-degree AV block left bundle branch block and had a subsequent pacemaker implanted at [facility].   He is here for preop clearance prior to undergoing colonoscopy l.

## 2022-03-30 NOTE — PHYSICAL EXAM
[Normal Conjunctiva] : the conjunctiva exhibited no abnormalities [Respiration, Rhythm And Depth] : normal respiratory rhythm and effort [Auscultation Breath Sounds / Voice Sounds] : lungs were clear to auscultation bilaterally [Heart Sounds] : normal S1 and S2 [FreeTextEntry1] : No significant edema of the lower extremities today

## 2022-04-19 RX ORDER — RIVAROXABAN 20 MG/1
20 TABLET, FILM COATED ORAL DAILY
Qty: 1 | Refills: 3 | Status: ACTIVE | COMMUNITY
Start: 2022-04-08 | End: 1900-01-01

## 2022-04-22 ENCOUNTER — APPOINTMENT (OUTPATIENT)
Dept: OTOLARYNGOLOGY | Facility: CLINIC | Age: 77
End: 2022-04-22

## 2022-05-02 ENCOUNTER — RX RENEWAL (OUTPATIENT)
Age: 77
End: 2022-05-02

## 2022-05-04 ENCOUNTER — APPOINTMENT (OUTPATIENT)
Dept: ELECTROPHYSIOLOGY | Facility: CLINIC | Age: 77
End: 2022-05-04

## 2022-05-10 ENCOUNTER — APPOINTMENT (OUTPATIENT)
Dept: ELECTROPHYSIOLOGY | Facility: CLINIC | Age: 77
End: 2022-05-10
Payer: MEDICARE

## 2022-05-10 ENCOUNTER — NON-APPOINTMENT (OUTPATIENT)
Age: 77
End: 2022-05-10

## 2022-05-10 PROCEDURE — 93296 REM INTERROG EVL PM/IDS: CPT

## 2022-05-10 PROCEDURE — 93294 REM INTERROG EVL PM/LDLS PM: CPT

## 2022-07-12 ENCOUNTER — APPOINTMENT (OUTPATIENT)
Dept: CARDIOLOGY | Facility: CLINIC | Age: 77
End: 2022-07-12

## 2022-07-12 ENCOUNTER — NON-APPOINTMENT (OUTPATIENT)
Age: 77
End: 2022-07-12

## 2022-07-12 VITALS
SYSTOLIC BLOOD PRESSURE: 140 MMHG | DIASTOLIC BLOOD PRESSURE: 78 MMHG | OXYGEN SATURATION: 97 % | HEART RATE: 67 BPM | WEIGHT: 224 LBS | HEIGHT: 71 IN | BODY MASS INDEX: 31.36 KG/M2

## 2022-07-12 DIAGNOSIS — U07.1 COVID-19: ICD-10-CM

## 2022-07-12 DIAGNOSIS — N52.9 MALE ERECTILE DYSFUNCTION, UNSPECIFIED: ICD-10-CM

## 2022-07-12 PROCEDURE — 99214 OFFICE O/P EST MOD 30 MIN: CPT

## 2022-07-12 PROCEDURE — 93000 ELECTROCARDIOGRAM COMPLETE: CPT

## 2022-07-12 RX ORDER — METHYLPREDNISOLONE 4 MG/1
4 TABLET ORAL
Qty: 1 | Refills: 0 | Status: DISCONTINUED | COMMUNITY
Start: 2022-01-21 | End: 2022-07-12

## 2022-07-12 NOTE — ASSESSMENT
[FreeTextEntry1] : Jeremie he has a long history of chronic ischemic heart disease status post bypass surgery, exertional shortness of breath with mild degrees of exertion even after the bypass which has not changed for many years.  He has some element of diastolic dysfunction inferior wall hypokinesia and mild to moderate aortic stenosis on his last echocardiogram  and status post pacemaker for new left bundle branch block and fatigue.  He had improved with less shortness of breath but recently had COVID-19 and urinary tract infection and colonoscopy with polypectomy.  He does complain of exertional chest burning.\par \par 1.  Chronic ischemic heart disease status post bypass surgery exertional shortness of breath and exertional chest pressure somewhat increased from last visit\par 2.  Chronic edema of the lower extremities probably related to some degree of diastolic dysfunction on Lasix\par 3.  Polycythemia vera new diagnosis on hydroxyurea followed by Dr. Baum\par 4.  Hyperlipidemia LDL way below 70 on small dose of statin\par 5.   moderate aortic stenosis with mild segmental LV dysfunction\par 6.    Status post pacemaker\par  7.  Recurrent onset of exertional shortness of breath and chest burning which seem to have resolved after the pacemaker.\par \par

## 2022-07-12 NOTE — REASON FOR VISIT
[FreeTextEntry1] : Jeremie Sanders 75 years old here for follow-up of his cardiac status.  He recently developed a first-degree AV block left bundle branch block and had a subsequent pacemaker implanted at [facility].

## 2022-07-12 NOTE — PHYSICAL EXAM
[Normal Conjunctiva] : the conjunctiva exhibited no abnormalities [Respiration, Rhythm And Depth] : normal respiratory rhythm and effort [Auscultation Breath Sounds / Voice Sounds] : lungs were clear to auscultation bilaterally [Heart Sounds] : normal S1 and S2 [FreeTextEntry1] :  trace to 1+ edema bilateral

## 2022-07-12 NOTE — HISTORY OF PRESENT ILLNESS
[FreeTextEntry1] : Jeremie is 74 years old and well-known to me.  He underwent coronary bypass surgery many years ago for multivessel disease.  Even postoperatively he continued to develop exertional shortness of breath with intermittent edema of his lower extremities.  He had subsequent catheterization which revealed inferior wall hypokinesia with most of the grafts patent with some distal coronary disease.\par \par Overall he is functioning well with chronic exertional shortness of breath at approximately 1 block but this is not changed for over 15 years.  He has no chest pain palpitations dizziness or syncope\par \par He does have a heart murmur compatible with mild aortic valve disease on last echo performed about 6 months ago\par \par He was recently diagnosed with polycythemia vera and has had phlebotomy and subsequently was placed on hydroxyurea.  His last hematocrit was 45.  \par \par blood tests last visit cholesterol 105 HDL 29 LDL 59 glucose 110 GFR 67 normal liver function tests\par \par Echo from 2019 revealed mild to moderate aortic stenosis with overall preserved LV function with inferior hypokinesia.\par \par Repeat echo 2021 January revealed low normal left ventricular function moderate aortic stenosis no pulmonary hypertension, valve area is calculated at 1.4 cm²\par \par He continued with exertional chest pressure and shortness of breath.  This occurs about 1-2 blocks and may be slightly less and in the past.  He has had an extensive pulmonary and repeat cardiac work-up.  He does have some distal occlusion from his bypasses but his symptoms have remained stable.  He remains overweight\par On last visit his symptoms had not changed.  He has some intermittent dizziness which is vertigo in nature.  He has exertional chest pain and exertional shortness of breath with mild degrees of exertion 1-2 blocks which has been present for many years\par \par EKG his internist office however reveals normal sinus rhythm first-degree AV block with a left bundle branch block.  The left bundle branch block is noted\par \par Blood tests are unremarkable creatinine 1.12 normal liver function tests potassium 3.9 hemoglobin 15.1 hematocrit 45, HDL 25 LDL 69 total cholesterol 110\par \par \par Most recent blood work\par Creatinine 0.93 potassium 3.9 normal liver function\par Cholesterol 91 HDL 21 LDL 46 TSH 2.83\par WBC 8.5 hemoglobin 15.0 adequate 43.5 sed rate 10 hemoglobin A1c 5.5\par \par Subsequent cardiac work-up revealed no significant change in the echo with mild to moderate segmental LV dysfunction.  Subsequent cardiac catheterization revealed mild to moderate segmental LV dysfunction with moderate aortic stenosis patent LIMA to the mid LAD patent graft to the diagonal with other grafts occluded and collaterals to the distal circumflex and right coronary artery.\par \par Subsequent to this he underwent implantation of a permanent pacemaker.  Apparently insurance would not approve of a biventricular device.\par \par He also has polycythemia vera and follows with a hematologist and has blood withdrawn frequently (phlebotomy) and is on hydroxyurea\par \par Actually since the pacemaker has been inserted the patient feels his shortness of breath is markedly improved and he has less edema.  He denies chest pain.  He does have improved exercise tolerance\par \par However had a meeting with his hematologist she was told that his heart rate was slow which caused him to make a follow-up visit with me\par \par Actually his EKG today reveals 100% paced with underlying normal sinus rhythm probably first-degree AV block the heart rate is set at 60 beats a minute\par \par He does have some recurrent episodes of vertigo which are mild and he has a dry cough of unclear etiology without fever chills.  The cough is nonproductive.  He has an appoint with ENT to evaluate for possible gastroesophageal reflux disease\par \par He had improved after the pacemaker but recently he has had a setback in his health.  He developed COVID-19, developed a cough and subsequently  developed hematuria and a urinary tract infection.  He was treated with antibiotics and eventually improved.  He had cystoscopy which did not reveal any significant pathology according to the patient,  he also has a lot of burning in his chest usually after eating but also occurring when he exerts himself.  He had colonoscopy which did reveal multiple polyps.\par \par  however since all of these events, he complains  Of exertional shortness of breath and chest burning with mild degrees of exertion.\par \par   However I did walk him back and forth and he did have an increase in heart rate and had no significant shortness of breath and her normal O2 saturation\par \par \par \par

## 2022-07-12 NOTE — DISCUSSION/SUMMARY
[FreeTextEntry1] : \par 1.  He will continue on his present regimen of medication including the Lasix\par  2.  Continue on his GI medicines for GERD symptoms\par  3.  I encouraged him to try to be active and exercise and if he develops the symptoms of chest burning he can try nitroglycerin 0.4 mg under his tongue.  He had been on Imdur in the past but it did not seem to help too much\par \par  follow-up with me again in 2 months

## 2022-07-19 ENCOUNTER — RX RENEWAL (OUTPATIENT)
Age: 77
End: 2022-07-19

## 2022-08-04 ENCOUNTER — APPOINTMENT (OUTPATIENT)
Dept: ELECTROPHYSIOLOGY | Facility: CLINIC | Age: 77
End: 2022-08-04

## 2022-08-05 ENCOUNTER — NON-APPOINTMENT (OUTPATIENT)
Age: 77
End: 2022-08-05

## 2022-08-05 ENCOUNTER — APPOINTMENT (OUTPATIENT)
Dept: ELECTROPHYSIOLOGY | Facility: CLINIC | Age: 77
End: 2022-08-05

## 2022-08-05 VITALS
RESPIRATION RATE: 14 BRPM | HEART RATE: 73 BPM | DIASTOLIC BLOOD PRESSURE: 81 MMHG | BODY MASS INDEX: 30.8 KG/M2 | WEIGHT: 220 LBS | OXYGEN SATURATION: 95 % | SYSTOLIC BLOOD PRESSURE: 121 MMHG | HEIGHT: 71 IN

## 2022-08-05 PROCEDURE — 93280 PM DEVICE PROGR EVAL DUAL: CPT

## 2022-08-05 PROCEDURE — 93000 ELECTROCARDIOGRAM COMPLETE: CPT | Mod: 59

## 2022-08-05 RX ORDER — SUCRALFATE 1 G/10ML
1 SUSPENSION ORAL
Qty: 300 | Refills: 0 | Status: DISCONTINUED | COMMUNITY
Start: 2022-04-05

## 2022-08-05 RX ORDER — FLUTICASONE PROPIONATE 50 UG/1
50 SPRAY, METERED NASAL
Qty: 1 | Refills: 5 | Status: DISCONTINUED | COMMUNITY
Start: 2022-01-21 | End: 2022-08-05

## 2022-08-05 RX ORDER — ISOSORBIDE MONONITRATE 60 MG/1
60 TABLET, EXTENDED RELEASE ORAL DAILY
Refills: 0 | Status: ACTIVE | COMMUNITY

## 2022-08-05 RX ORDER — OMEPRAZOLE MAGNESIUM 20 MG/1
20 CAPSULE, DELAYED RELEASE ORAL
Refills: 0 | Status: ACTIVE | COMMUNITY

## 2022-08-05 RX ORDER — MECLIZINE HCL 25 MG/1
25 TABLET, CHEWABLE ORAL EVERY 8 HOURS
Qty: 90 | Refills: 2 | Status: DISCONTINUED | COMMUNITY
End: 2022-08-05

## 2022-08-05 RX ORDER — AMOXICILLIN AND CLAVULANATE POTASSIUM 875; 125 MG/1; MG/1
875-125 TABLET, COATED ORAL
Qty: 20 | Refills: 0 | Status: DISCONTINUED | COMMUNITY
Start: 2022-03-28

## 2022-08-07 VITALS
RESPIRATION RATE: 14 BRPM | HEART RATE: 73 BPM | OXYGEN SATURATION: 95 % | DIASTOLIC BLOOD PRESSURE: 81 MMHG | WEIGHT: 220 LBS | HEIGHT: 71 IN | SYSTOLIC BLOOD PRESSURE: 121 MMHG | BODY MASS INDEX: 30.8 KG/M2

## 2022-08-07 NOTE — DISCUSSION/SUMMARY
[EKG obtained to assist in diagnosis and management of assessed problem(s)] : EKG obtained to assist in diagnosis and management of assessed problem(s) [FreeTextEntry1] : He continues to have exertional dyspnea despite normal LV function. No MR. The etiology of dyspnea on exertion may be due to LBBB but with a normal EF he may not qualify for CRT upgrade and there is no MR reported. I will review the echo to assess for the degree of dyssynchrony. Moderate aortic stenosis may also be in part responsible. He continues on Xarelto for PAF but has had no recent episodes. Pacemaker interrogation shows normal battery, lead sensing and function. Rare AF episodes.

## 2022-08-07 NOTE — HISTORY OF PRESENT ILLNESS
[FreeTextEntry1] :  has had no further syncope and exertional dyspnea has improved but still present. He does get dyspneic with one flight of stairs. His Paced QRS is 166 ms. He does have aortic stenosis with an TRISTAN of 1.2 cm2. LV has paradoxical motion due to pacing with an EF of 50-55% and no MR. We did not place a CRT because of baseline normal LV function.

## 2022-08-07 NOTE — REVIEW OF SYSTEMS
[SOB] : shortness of breath [Dyspnea on exertion] : dyspnea during exertion [Anxiety] : anxiety [Negative] : Heme/Lymph [Chest Discomfort] : no chest discomfort [Palpitations] : no palpitations [Orthopnea] : no orthopnea [Syncope] : no syncope [Under Stress] : not under stress

## 2022-08-07 NOTE — PHYSICAL EXAM

## 2022-08-09 ENCOUNTER — APPOINTMENT (OUTPATIENT)
Dept: ELECTROPHYSIOLOGY | Facility: CLINIC | Age: 77
End: 2022-08-09

## 2022-08-09 ENCOUNTER — NON-APPOINTMENT (OUTPATIENT)
Age: 77
End: 2022-08-09

## 2022-08-09 PROCEDURE — 93296 REM INTERROG EVL PM/IDS: CPT

## 2022-08-09 PROCEDURE — 93294 REM INTERROG EVL PM/LDLS PM: CPT

## 2022-09-06 ENCOUNTER — RX RENEWAL (OUTPATIENT)
Age: 77
End: 2022-09-06

## 2022-09-16 ENCOUNTER — APPOINTMENT (OUTPATIENT)
Dept: CARDIOLOGY | Facility: CLINIC | Age: 77
End: 2022-09-16

## 2022-09-16 VITALS
SYSTOLIC BLOOD PRESSURE: 108 MMHG | OXYGEN SATURATION: 97 % | DIASTOLIC BLOOD PRESSURE: 70 MMHG | WEIGHT: 220 LBS | BODY MASS INDEX: 30.68 KG/M2 | HEART RATE: 76 BPM

## 2022-09-16 PROCEDURE — 99214 OFFICE O/P EST MOD 30 MIN: CPT

## 2022-09-16 RX ORDER — ERYTHROMYCIN 20 MG/G
2 GEL TOPICAL
Refills: 0 | Status: DISCONTINUED | COMMUNITY
End: 2022-09-16

## 2022-09-16 NOTE — HISTORY OF PRESENT ILLNESS
[FreeTextEntry1] : Jeremie is 74 years old and well-known to me.  He underwent coronary bypass surgery many years ago for multivessel disease.  Even postoperatively he continued to develop exertional shortness of breath with intermittent edema of his lower extremities.  He had subsequent catheterization which revealed inferior wall hypokinesia with most of the grafts patent with some distal coronary disease.\par \par Overall he is functioning well with chronic exertional shortness of breath at approximately 1 block but this is not changed for over 15 years.  He has no chest pain palpitations dizziness or syncope\par \par He does have a heart murmur compatible with mild aortic valve disease on last echo performed about 6 months ago\par \par He was  diagnosed with polycythemia vera and has had phlebotomy and subsequently was placed on hydroxyurea.  His last hematocrit was 45.  \par \par blood tests  previousvisit cholesterol 105 HDL 29 LDL 59 glucose 110 GFR 67 normal liver function tests\par \par Echo from 2019 revealed mild to moderate aortic stenosis with overall preserved LV function with inferior hypokinesia.\par \par Repeat echo 2021 January revealed low normal left ventricular function moderate aortic stenosis no pulmonary hypertension, valve area is calculated at 1.4 cm²\par \par He continued with exertional chest pressure and shortness of breath.  This occurs about 1-2 blocks and may be slightly less and in the past.  He has had an extensive pulmonary and repeat cardiac work-up.  He does have some distal occlusion from his bypasses but his symptoms have remained stable.  \par \par  2021 to early 2022, vishis symptoms had not changed.  He has some intermittent dizziness which is vertigo in nature.  He has exertional chest pain and exertional shortness of breath with mild degrees of exertion 1-2 blocks which has been present for many years\par \par EKG his internist office however reveals normal sinus rhythm first-degree AV block with a left bundle branch block.  The left bundle branch block is noted\par \par Blood tests are unremarkable creatinine 1.12 normal liver function tests potassium 3.9 hemoglobin 15.1 hematocrit 45, HDL 25 LDL 69 total cholesterol 110\par \par \par t blood work- September 2021\par Creatinine 0.93 potassium 3.9 normal liver function\par Cholesterol 91 HDL 21 LDL 46 TSH 2.83\par WBC 8.5 hemoglobin 15.0 adequate 43.5 sed rate 10 hemoglobin A1c 5.5\par \par Subsequent cardiac work-up revealed no significant change in the echo with mild to moderate segmental LV dysfunction. \par \par  Subsequent cardiac catheterization  October 2021revealed mild to moderate segmental LV dysfunction with moderate aortic stenosis patent LIMA to the mid LAD patent graft to the diagonal with other grafts occluded and collaterals to the distal circumflex and right coronary artery.\par \par Subsequent to this he underwent implantation of a permanent pacemaker.  Apparently insurance would not approve of a biventricular device.\par \par He also has polycythemia vera and follows with a hematologist and has blood withdrawn frequently (phlebotomy) and is on hydroxyurea\par \par Actually  immediately after the pacemaker has been inserted the patient feels his shortness of breath is markedly improved and he has less edema.  He denies chest pain.  \par \par Actually his EKG  July 2022 reveals 100% paced with underlying normal sinus rhythm probably first-degree AV block the heart rate is set at 60 beats a minute\par \par \par \par He had improved after the pacemaker but recently he has had a setback in his health.  He developed COVID-19, developed a cough and subsequently  developed hematuria and a urinary tract infection.  He was treated with antibiotics and eventually improved.  He had cystoscopy which did not reveal any significant pathology according to the patient,  he also has a lot of burning in his chest usually after eating but also occurring when he exerts himself.  He had colonoscopy which did reveal multiple polyps.\par \par  however since all of these events, he complains  Of exertional shortness of breath and chest burning with mild degrees of exertion.\par \par  September 16, 2022-  the symptoms of shortness of breath have increased significantly.  He is unable to do vacuuming in his house or walk any distance without feeling short of breath but no chest pain.  He also has developed increasing edema of his lower extremities and his Lasix has been increased to 80 mg.  He feels that he cannot do any activity and is very concerned\par \par  I have made an appointment for him to be seen on September 20 at the valve center to evaluate progression of the aortic stenosis with an echo and visit with Dr. Bandar Berry.\par \par \par \par   However I did walk him back and forth and he did have an increase in heart rate and had no significant shortness of breath and her normal O2 saturation\par \par \par \par

## 2022-09-16 NOTE — DISCUSSION/SUMMARY
[FreeTextEntry1] : \par 1.  The patient will remain on his present regimen of medications\par  2. full blood work drawn today including BNP CBC complete metabolic profile\par  3.  Proceed with appointment with Dr. Berry  and the valve center for an echocardiogram and full evaluation of his aortic stenosis\par \par

## 2022-09-16 NOTE — REASON FOR VISIT
[FreeTextEntry1] : Jeremie Sanders 76 years old was seen in urgently  because of progressive shortness of breath with minimal exertion and increasing edema of his lower extremities.  He is scheduled for an appointment with structural heart to evaluate his aortic stenosis this coming Tuesday, September 20

## 2022-09-16 NOTE — PHYSICAL EXAM
[Normal Conjunctiva] : the conjunctiva exhibited no abnormalities [Respiration, Rhythm And Depth] : normal respiratory rhythm and effort [Auscultation Breath Sounds / Voice Sounds] : lungs were clear to auscultation bilaterally [Heart Sounds] : normal S1 and S2 [FreeTextEntry1] :  2+ pitting edema bilateral

## 2022-09-16 NOTE — ASSESSMENT
[FreeTextEntry1] : Jeremie he has a long history of chronic ischemic heart disease status post bypass surgery, exertional shortness of breath with mild degrees of exertion even after the bypass which has not changed for many years.  He has some element of diastolic dysfunction inferior wall hypokinesia and mild to moderate aortic stenosis on his last echocardiogram  and status post pacemaker for new left bundle branch block and fatigue.  He had improved with less shortness of breath but recently had COVID-19 and urinary tract infection and colonoscopy with polypectomy.   presently he has had progressive shortness of breath now with minimal exertion and edema of his lower extremities and extreme fatigue.  He did have moderate aortic stenosis on previous echo\par 1.  Chronic ischemic heart disease status post bypass surgery exertional shortness of breath and exertional chest pressure somewhat increased from last visit\par 2.  Chronic edema of the lower extremities probably related to some degree of diastolic dysfunction on Lasix\par 3.  Polycythemia vera new diagnosis on hydroxyurea followed by Dr. Baum\par 4.  Hyperlipidemia LDL way below 70 on small dose of statin\par 5.   moderate aortic stenosis with mild segmental LV dysfunction\par 6.    Status post pacemaker\par  7.   progressive exertional shortness of breath with minimal exertion and edema of the lower extremities suggestive of congestive heart failure perhaps progression of aortic stenosis.\par \par

## 2022-09-19 ENCOUNTER — RX RENEWAL (OUTPATIENT)
Age: 77
End: 2022-09-19

## 2022-09-19 LAB
ALBUMIN SERPL ELPH-MCNC: 4.8 G/DL
ALP BLD-CCNC: 67 U/L
ALT SERPL-CCNC: 51 U/L
ANION GAP SERPL CALC-SCNC: 15 MMOL/L
AST SERPL-CCNC: 36 U/L
BASOPHILS # BLD AUTO: 0.07 K/UL
BASOPHILS NFR BLD AUTO: 0.6 %
BILIRUB SERPL-MCNC: 1.3 MG/DL
BUN SERPL-MCNC: 14 MG/DL
CALCIUM SERPL-MCNC: 10 MG/DL
CHLORIDE SERPL-SCNC: 104 MMOL/L
CO2 SERPL-SCNC: 25 MMOL/L
CREAT SERPL-MCNC: 1.09 MG/DL
CRP SERPL HS-MCNC: 1.35 MG/L
EGFR: 71 ML/MIN/1.73M2
EOSINOPHIL # BLD AUTO: 0.17 K/UL
EOSINOPHIL NFR BLD AUTO: 1.5 %
GLUCOSE SERPL-MCNC: 113 MG/DL
HCT VFR BLD CALC: 48.5 %
HGB BLD-MCNC: 15.7 G/DL
IMM GRANULOCYTES NFR BLD AUTO: 1.7 %
LYMPHOCYTES # BLD AUTO: 2.3 K/UL
LYMPHOCYTES NFR BLD AUTO: 20.1 %
MAN DIFF?: NORMAL
MCHC RBC-ENTMCNC: 32.2 PG
MCHC RBC-ENTMCNC: 32.4 GM/DL
MCV RBC AUTO: 99.6 FL
MONOCYTES # BLD AUTO: 1.25 K/UL
MONOCYTES NFR BLD AUTO: 10.9 %
NEUTROPHILS # BLD AUTO: 7.46 K/UL
NEUTROPHILS NFR BLD AUTO: 65.2 %
NT-PROBNP SERPL-MCNC: 286 PG/ML
PLATELET # BLD AUTO: 220 K/UL
POTASSIUM SERPL-SCNC: 4.1 MMOL/L
PROT SERPL-MCNC: 7 G/DL
RBC # BLD: 4.87 M/UL
RBC # FLD: 13.6 %
SODIUM SERPL-SCNC: 145 MMOL/L
WBC # FLD AUTO: 11.44 K/UL

## 2022-09-20 ENCOUNTER — APPOINTMENT (OUTPATIENT)
Dept: CARDIOTHORACIC SURGERY | Facility: CLINIC | Age: 77
End: 2022-09-20

## 2022-09-20 ENCOUNTER — NON-APPOINTMENT (OUTPATIENT)
Age: 77
End: 2022-09-20

## 2022-09-20 ENCOUNTER — OUTPATIENT (OUTPATIENT)
Dept: OUTPATIENT SERVICES | Facility: HOSPITAL | Age: 77
LOS: 1 days | End: 2022-09-20
Payer: MEDICARE

## 2022-09-20 VITALS
HEIGHT: 71 IN | WEIGHT: 225 LBS | TEMPERATURE: 98 F | BODY MASS INDEX: 31.5 KG/M2 | RESPIRATION RATE: 15 BRPM | OXYGEN SATURATION: 98 % | SYSTOLIC BLOOD PRESSURE: 125 MMHG | DIASTOLIC BLOOD PRESSURE: 78 MMHG | HEART RATE: 71 BPM

## 2022-09-20 VITALS
SYSTOLIC BLOOD PRESSURE: 125 MMHG | OXYGEN SATURATION: 97 % | BODY MASS INDEX: 31.5 KG/M2 | TEMPERATURE: 97.7 F | HEIGHT: 71 IN | DIASTOLIC BLOOD PRESSURE: 78 MMHG | HEART RATE: 71 BPM | WEIGHT: 225 LBS | RESPIRATION RATE: 18 BRPM

## 2022-09-20 DIAGNOSIS — Z98.89 OTHER SPECIFIED POSTPROCEDURAL STATES: Chronic | ICD-10-CM

## 2022-09-20 DIAGNOSIS — Z95.1 PRESENCE OF AORTOCORONARY BYPASS GRAFT: Chronic | ICD-10-CM

## 2022-09-20 DIAGNOSIS — I35.0 NONRHEUMATIC AORTIC (VALVE) STENOSIS: ICD-10-CM

## 2022-09-20 DIAGNOSIS — Z82.49 FAMILY HISTORY OF ISCHEMIC HEART DISEASE AND OTHER DISEASES OF THE CIRCULATORY SYSTEM: ICD-10-CM

## 2022-09-20 DIAGNOSIS — Z80.51 FAMILY HISTORY OF MALIGNANT NEOPLASM OF KIDNEY: ICD-10-CM

## 2022-09-20 DIAGNOSIS — Z90.49 ACQUIRED ABSENCE OF OTHER SPECIFIED PARTS OF DIGESTIVE TRACT: Chronic | ICD-10-CM

## 2022-09-20 LAB
BASOPHILS # BLD AUTO: 0.06 K/UL
BASOPHILS NFR BLD AUTO: 0.5 %
EOSINOPHIL # BLD AUTO: 0.18 K/UL
EOSINOPHIL NFR BLD AUTO: 1.5 %
HCT VFR BLD CALC: 45.1 %
HGB BLD-MCNC: 15.4 G/DL
IMM GRANULOCYTES NFR BLD AUTO: 1.6 %
LYMPHOCYTES # BLD AUTO: 1.95 K/UL
LYMPHOCYTES NFR BLD AUTO: 16.6 %
MAN DIFF?: NORMAL
MCHC RBC-ENTMCNC: 32.6 PG
MCHC RBC-ENTMCNC: 34.1 GM/DL
MCV RBC AUTO: 95.6 FL
MONOCYTES # BLD AUTO: 1.45 K/UL
MONOCYTES NFR BLD AUTO: 12.4 %
NEUTROPHILS # BLD AUTO: 7.89 K/UL
NEUTROPHILS NFR BLD AUTO: 67.4 %
NT-PROBNP SERPL-MCNC: 256 PG/ML
PLATELET # BLD AUTO: 206 K/UL
RBC # BLD: 4.72 M/UL
RBC # FLD: 13.5 %
WBC # FLD AUTO: 11.72 K/UL

## 2022-09-20 PROCEDURE — 93306 TTE W/DOPPLER COMPLETE: CPT

## 2022-09-20 PROCEDURE — 99214 OFFICE O/P EST MOD 30 MIN: CPT

## 2022-09-20 PROCEDURE — 93306 TTE W/DOPPLER COMPLETE: CPT | Mod: 26

## 2022-09-20 PROCEDURE — 99205 OFFICE O/P NEW HI 60 MIN: CPT

## 2022-09-20 RX ORDER — MAG/ALUMINUM/SOD BICARB/ALGINC 20 MG-80MG
80-20 TABLET,CHEWABLE ORAL
Refills: 0 | Status: ACTIVE | COMMUNITY

## 2022-09-20 RX ORDER — HYDROXYUREA 500 MG/1
500 CAPSULE ORAL DAILY
Refills: 0 | Status: ACTIVE | COMMUNITY

## 2022-09-20 RX ORDER — ASPIRIN 81 MG
81 TABLET, DELAYED RELEASE (ENTERIC COATED) ORAL DAILY
Refills: 0 | Status: ACTIVE | COMMUNITY

## 2022-09-21 LAB
ALBUMIN SERPL ELPH-MCNC: 4.5 G/DL
ALP BLD-CCNC: 63 U/L
ALT SERPL-CCNC: 47 U/L
ANION GAP SERPL CALC-SCNC: 15 MMOL/L
AST SERPL-CCNC: 36 U/L
BILIRUB SERPL-MCNC: 0.9 MG/DL
BUN SERPL-MCNC: 15 MG/DL
CALCIUM SERPL-MCNC: 10.2 MG/DL
CHLORIDE SERPL-SCNC: 104 MMOL/L
CO2 SERPL-SCNC: 25 MMOL/L
CREAT SERPL-MCNC: 0.97 MG/DL
EGFR: 81 ML/MIN/1.73M2
GLUCOSE SERPL-MCNC: 97 MG/DL
POTASSIUM SERPL-SCNC: 4.2 MMOL/L
PROT SERPL-MCNC: 7.1 G/DL
SODIUM SERPL-SCNC: 144 MMOL/L

## 2022-09-21 NOTE — REVIEW OF SYSTEMS
[Feeling Tired] : feeling tired [SOB on Exertion] : shortness of breath during exertion [Joint Stiffness] : joint stiffness [Eye Pain] : no eye pain [Earache] : no earache [Chest Pain] : no chest pain [Palpitations] : no palpitations [Abdominal Pain] : no abdominal pain [Dysuria] : no dysuria [Skin Lesions] : no skin lesions [Dizziness] : no dizziness [Fainting] : no fainting [Anxiety] : no anxiety [Muscle Weakness] : no muscle weakness [Easy Bleeding] : no tendency for easy bleeding

## 2022-09-21 NOTE — DATA REVIEWED
[FreeTextEntry1] : Repeat echocardiogram today demonstrates severe aortic stenosis with an TRISTAN of 0.8 sqcm, peak and mean gradients of 66 and 34 mmHg, AoV 4.1 m/s, and a DVI of 0.20; mild aortic insufficiency and mildly reduced LV function were also noted (LVEF 45% with basal inferior and inferolateral hypokinesis--consistent with his history of CAD, remote IWMI, and a prior CABG). \par \par \par Echo: 9/30/21\par EF 50-55%, TRISTAN 1.2 sqcm, mGr 30 mmHg, pGr 58 mmHg \par Cardiac Cath/PCI: 10/11/21\par Coronary Angiography\par Left main artery: mild luminal disease.\par Mid left anterior descending: LIMA to mid LAD widely patent. There is a 100 % stenosis. First diagonal: present in 2017 small\par distal territroy. There is an 80 % stenosis. Lateral first diagonal: SVG to this large diagonal is widely patent. There is a 100 %\par stenosis.\par Mid circumflex: graft to distal cx occluded collaterals from graft to lateral diagonal fills the distal cx. There is a 100 % stenosis.\par First obtuse marginal: mild luminal disease SVG to OM1 is occluded.\par Proximal right coronary artery: SVG to distal RCA is occluded. There is a 100 % stenosis.\par \par Graft Angiography\par LIMA graft to Mid left anterior descending: Angiography shows no disease.\par SVG graft to Lateral first diagonal: Angiography shows minor irregularities. large vessel fills the distal cx.\par SVG graft to Distal right coronary artery: Angiography shows complete occlusion.\par SVG graft to First obtuse marginal: Angiography shows complete occlusion. the OM1 is free of disease.

## 2022-09-21 NOTE — HISTORY OF PRESENT ILLNESS
[FreeTextEntry1] : Mr. Sanders is a 76 year old male referred by Dr. Jorge Tellez for evaluation of aortic stenosis. He has a known history of moderate AS on TTE last September and reports a recent progression of exertional dyspnea with chronic chest heaviness after walking one block, fatigue and intermittent LE edema. He has no angina, PND, orthopnea, dizziness, or syncope. \par \par His past medical history includes ICM s/p CABG, AFib on Xarelto, pSVT, PPM placement (followed by Dr. Delacruz) and GERD. He was recently diagnosed with polycythemia vera and has had phlebotomy and subsequently was placed on hydroxyurea. \par \par Repeat echocardiogram today demonstrates severe aortic stenosis with an TRISTAN of 0.8 sqcm, peak and mean gradients of 66 and 34 mmHg, AoV 4.1 m/s, and a DVI of 0.20; mild aortic insufficiency and mildly reduced LV function were also noted (LVEF 45% with basal inferior and inferolateral hypokinesis--consistent with his history of CAD, remote IWMI, and a prior CABG). \par \par

## 2022-09-21 NOTE — END OF VISIT
[FreeTextEntry3] : I personally performed the services described in the documentation, reviewed the documentation recorded by the scribe in my presence and it accurately and completely records my words and actions.\par \par I, Aylin Mcfadden NP, am scribing for Dr. Daniel Martinez, the following sections HISTORY OF PRESENT ILLNESS, PAST MEDICAL/FAMILY/SOCIAL HISTORY; REVIEW OF SYSTEMS; VITAL SIGNS; PHYSICAL EXAM; DISPOSITION.\par

## 2022-09-21 NOTE — PHYSICAL EXAM
[Outer Ear] : the ears and nose were normal in appearance [Jugular Venous Distention Increased] : there was no jugular-venous distention [] : no respiratory distress [Respiration, Rhythm And Depth] : normal respiratory rhythm and effort [Auscultation Breath Sounds / Voice Sounds] : lungs were clear to auscultation bilaterally [II] : a grade 2 [___ +] : bilateral [unfilled]U+ pitting edema to the ankles [Surgical / Traumatic Scar Sternum] : a scar [Bowel Sounds] : normal bowel sounds [Abdomen Soft] : soft [Cervical Lymph Nodes Enlarged Posterior Bilaterally] : posterior cervical [Abnormal Walk] : normal gait [No Focal Deficits] : no focal deficits [Oriented To Time, Place, And Person] : oriented to person, place, and time [Impaired Insight] : insight and judgment were intact [Right Carotid Bruit] : no bruit heard over the right carotid [Left Carotid Bruit] : no bruit heard over the left carotid

## 2022-09-21 NOTE — ASSESSMENT
[FreeTextEntry1] : Mr. Sanders is a 76 year old male referred by Dr. Jorge Tellez for evaluation of aortic stenosis. He has a known history of moderate AS on TTE last September and reports a recent progression of exertional dyspnea with chronic chest heaviness after walking one block, fatigue and intermittent LE edema. He has no angina, PND, orthopnea, dizziness, or syncope. \par \par His past medical history includes ICM s/p CABG, AFib on Xarelto, pSVT, PPM placement (followed by Dr. Delacruz) and GERD. He was recently diagnosed with polycythemia vera and has had phlebotomy and subsequently was placed on hydroxyurea. \par \par Repeat echocardiogram today demonstrates severe aortic stenosis with an TRISTAN of 0.8 sqcm, peak and mean gradients of 66 and 34 mmHg, AoV 4.1 m/s, and a DVI of 0.20; mild aortic insufficiency and mildly reduced LV function were also noted (LVEF 45% with basal inferior and inferolateral hypokinesis--consistent with his history of CAD, remote IWMI, and a prior CABG). \par \par I have reviewed the patient's medical records, diagnostic images during the time of this office consultation and have made the following recommendation. Review of the imaging shows his aortic pathology has progressed and will require surgical intervention. \par \par Risks, benefits and alternatives to surgery discussed. Risks included but not limited to bleeding, risk of stroke, myocardial Infarction, kidney problems, blood transfusion, permanent pacemaker implantation, infections and death. Operative mortality and complication risks are low. Various approaches discussed in detail. \par \par All questions have been fully answered and concerns addressed. \par \par Plan:\par 1) CT SCAN \par 2) LAST CARDIAC CATH REVIEWED- NO REPEAT NEEDED\par 3) PLAN FOR TAVR\par

## 2022-09-23 ENCOUNTER — APPOINTMENT (OUTPATIENT)
Dept: CARDIOLOGY | Facility: HOSPITAL | Age: 77
End: 2022-09-23

## 2022-09-23 ENCOUNTER — APPOINTMENT (OUTPATIENT)
Dept: CARDIOLOGY | Facility: CLINIC | Age: 77
End: 2022-09-23

## 2022-09-23 ENCOUNTER — OUTPATIENT (OUTPATIENT)
Dept: OUTPATIENT SERVICES | Facility: HOSPITAL | Age: 77
LOS: 1 days | End: 2022-09-23
Payer: MEDICARE

## 2022-09-23 DIAGNOSIS — Z95.1 PRESENCE OF AORTOCORONARY BYPASS GRAFT: Chronic | ICD-10-CM

## 2022-09-23 DIAGNOSIS — Z98.89 OTHER SPECIFIED POSTPROCEDURAL STATES: Chronic | ICD-10-CM

## 2022-09-23 DIAGNOSIS — Z90.49 ACQUIRED ABSENCE OF OTHER SPECIFIED PARTS OF DIGESTIVE TRACT: Chronic | ICD-10-CM

## 2022-09-23 DIAGNOSIS — R53.83 OTHER FATIGUE: ICD-10-CM

## 2022-09-23 DIAGNOSIS — Z00.00 ENCOUNTER FOR GENERAL ADULT MEDICAL EXAMINATION WITHOUT ABNORMAL FINDINGS: ICD-10-CM

## 2022-09-23 PROCEDURE — 75574 CT ANGIO HRT W/3D IMAGE: CPT

## 2022-09-23 PROCEDURE — 75574 CT ANGIO HRT W/3D IMAGE: CPT | Mod: 26

## 2022-10-04 ENCOUNTER — RX RENEWAL (OUTPATIENT)
Age: 77
End: 2022-10-04

## 2022-10-04 ENCOUNTER — OUTPATIENT (OUTPATIENT)
Dept: OUTPATIENT SERVICES | Facility: HOSPITAL | Age: 77
LOS: 1 days | End: 2022-10-04
Payer: MEDICARE

## 2022-10-04 ENCOUNTER — APPOINTMENT (OUTPATIENT)
Dept: ULTRASOUND IMAGING | Facility: HOSPITAL | Age: 77
End: 2022-10-04

## 2022-10-04 VITALS — HEIGHT: 71 IN | WEIGHT: 225.09 LBS

## 2022-10-04 DIAGNOSIS — Z98.89 OTHER SPECIFIED POSTPROCEDURAL STATES: Chronic | ICD-10-CM

## 2022-10-04 DIAGNOSIS — Z90.49 ACQUIRED ABSENCE OF OTHER SPECIFIED PARTS OF DIGESTIVE TRACT: Chronic | ICD-10-CM

## 2022-10-04 DIAGNOSIS — Z11.52 ENCOUNTER FOR SCREENING FOR COVID-19: ICD-10-CM

## 2022-10-04 DIAGNOSIS — K21.9 GASTRO-ESOPHAGEAL REFLUX DISEASE WITHOUT ESOPHAGITIS: ICD-10-CM

## 2022-10-04 DIAGNOSIS — I35.0 NONRHEUMATIC AORTIC (VALVE) STENOSIS: ICD-10-CM

## 2022-10-04 DIAGNOSIS — I25.10 ATHEROSCLEROTIC HEART DISEASE OF NATIVE CORONARY ARTERY WITHOUT ANGINA PECTORIS: ICD-10-CM

## 2022-10-04 DIAGNOSIS — I48.91 UNSPECIFIED ATRIAL FIBRILLATION: ICD-10-CM

## 2022-10-04 DIAGNOSIS — Z95.0 PRESENCE OF CARDIAC PACEMAKER: Chronic | ICD-10-CM

## 2022-10-04 DIAGNOSIS — Z95.1 PRESENCE OF AORTOCORONARY BYPASS GRAFT: Chronic | ICD-10-CM

## 2022-10-04 DIAGNOSIS — Z29.9 ENCOUNTER FOR PROPHYLACTIC MEASURES, UNSPECIFIED: ICD-10-CM

## 2022-10-04 LAB
A1C WITH ESTIMATED AVERAGE GLUCOSE RESULT: 5.4 % — SIGNIFICANT CHANGE UP (ref 4–5.6)
BLD GP AB SCN SERPL QL: NEGATIVE — SIGNIFICANT CHANGE UP
ESTIMATED AVERAGE GLUCOSE: 108 MG/DL — SIGNIFICANT CHANGE UP (ref 68–114)
MRSA PCR RESULT.: SIGNIFICANT CHANGE UP
RH IG SCN BLD-IMP: POSITIVE — SIGNIFICANT CHANGE UP
S AUREUS DNA NOSE QL NAA+PROBE: SIGNIFICANT CHANGE UP
SARS-COV-2 RNA SPEC QL NAA+PROBE: SIGNIFICANT CHANGE UP

## 2022-10-04 PROCEDURE — 87640 STAPH A DNA AMP PROBE: CPT

## 2022-10-04 PROCEDURE — U0005: CPT

## 2022-10-04 PROCEDURE — 86901 BLOOD TYPING SEROLOGIC RH(D): CPT

## 2022-10-04 PROCEDURE — U0003: CPT

## 2022-10-04 PROCEDURE — C9803: CPT

## 2022-10-04 PROCEDURE — 83036 HEMOGLOBIN GLYCOSYLATED A1C: CPT

## 2022-10-04 PROCEDURE — 86923 COMPATIBILITY TEST ELECTRIC: CPT

## 2022-10-04 PROCEDURE — 86850 RBC ANTIBODY SCREEN: CPT

## 2022-10-04 PROCEDURE — 87641 MR-STAPH DNA AMP PROBE: CPT

## 2022-10-04 PROCEDURE — 93880 EXTRACRANIAL BILAT STUDY: CPT | Mod: 26

## 2022-10-04 PROCEDURE — 86900 BLOOD TYPING SEROLOGIC ABO: CPT

## 2022-10-04 PROCEDURE — G0463: CPT

## 2022-10-04 PROCEDURE — 71046 X-RAY EXAM CHEST 2 VIEWS: CPT

## 2022-10-04 PROCEDURE — 71046 X-RAY EXAM CHEST 2 VIEWS: CPT | Mod: 26

## 2022-10-04 PROCEDURE — 93880 EXTRACRANIAL BILAT STUDY: CPT

## 2022-10-04 PROCEDURE — 36415 COLL VENOUS BLD VENIPUNCTURE: CPT

## 2022-10-04 RX ORDER — TAMSULOSIN HYDROCHLORIDE 0.4 MG/1
1 CAPSULE ORAL
Qty: 0 | Refills: 0 | DISCHARGE

## 2022-10-04 RX ORDER — FUROSEMIDE 40 MG
1 TABLET ORAL
Qty: 0 | Refills: 0 | DISCHARGE

## 2022-10-04 RX ORDER — CEFUROXIME AXETIL 250 MG
1500 TABLET ORAL ONCE
Refills: 0 | Status: DISCONTINUED | OUTPATIENT
Start: 2022-10-06 | End: 2022-10-07

## 2022-10-04 RX ORDER — ACETAMINOPHEN 500 MG
2 TABLET ORAL
Qty: 0 | Refills: 0 | DISCHARGE

## 2022-10-04 RX ORDER — ISOSORBIDE MONONITRATE 60 MG/1
1 TABLET, EXTENDED RELEASE ORAL
Qty: 0 | Refills: 0 | DISCHARGE

## 2022-10-04 RX ORDER — ASPIRIN/CALCIUM CARB/MAGNESIUM 324 MG
1 TABLET ORAL
Qty: 0 | Refills: 0 | DISCHARGE

## 2022-10-04 NOTE — H&P PST ADULT - NSANTHOSAYNRD_GEN_A_CORE
No. TIA screening performed.  STOP BANG Legend: 0-2 = LOW Risk; 3-4 = INTERMEDIATE Risk; 5-8 = HIGH Risk

## 2022-10-04 NOTE — H&P PST ADULT - PROBLEM SELECTOR PLAN 1
scheduled TAVR on 10/6/22  -preop instructions done  -Labs: CBC, CMP in chart from 9/20/22, A1c, T&S, MRSA done in PST  -Cxray, B/L Carotid US today  -last PPM interrogation in chart

## 2022-10-04 NOTE — H&P PST ADULT - NSICDXPASTSURGICALHX_GEN_ALL_CORE_FT
PAST SURGICAL HISTORY:  Cardiac pacemaker 2021    S/P CABG x 6 8/15/2002    S/P cholecystectomy     S/P hernia surgery inguinal hernia  repair at 10 yrs of age    S/P tonsillectomy as a child

## 2022-10-04 NOTE — H&P PST ADULT - ASSESSMENT
RICHARDI VTE 2.0 SCORE [CLOT updated 2019]    AGE RELATED RISK FACTORS                                                       MOBILITY RELATED FACTORS  [ ] Age 41-60 years                                            (1 Point)                    [ ] Bed rest                                                        (1 Point)  [ ] Age: 61-74 years                                           (2 Points)                  [ ] Plaster cast                                                   (2 Points)  [ ] Age= 75 years                                              (3 Points)                    [ ] Bed bound for more than 72 hours                 (2 Points)    DISEASE RELATED RISK FACTORS                                               GENDER SPECIFIC FACTORS  [ ] Edema in the lower extremities                       (1 Point)              [ ] Pregnancy                                                     (1 Point)  [ ] Varicose veins                                               (1 Point)                     [ ] Post-partum < 6 weeks                                   (1 Point)             [ ] BMI > 25 Kg/m2                                            (1 Point)                     [ ] Hormonal therapy  or oral contraception          (1 Point)                 [ ] Sepsis (in the previous month)                        (1 Point)               [ ] History of pregnancy complications                 (1 point)  [ ] Pneumonia or serious lung disease                                               [ ] Unexplained or recurrent                     (1 Point)           (in the previous month)                               (1 Point)  [ ] Abnormal pulmonary function test                     (1 Point)                 SURGERY RELATED RISK FACTORS  [ ] Acute myocardial infarction                              (1 Point)               [ ]  Section                                             (1 Point)  [ ] Congestive heart failure (in the previous month)  (1 Point)      [ ] Minor surgery                                                  (1 Point)   [ ] Inflammatory bowel disease                             (1 Point)               [ ] Arthroscopic surgery                                        (2 Points)  [ ] Central venous access                                      (2 Points)                [ ] General surgery lasting more than 45 minutes (2 points)  [ ] Malignancy- Present or previous                   (2 Points)                [ ] Elective arthroplasty                                         (5 points)    [ ] Stroke (in the previous month)                          (5 Points)                                                                                                                                                           HEMATOLOGY RELATED FACTORS                                                 TRAUMA RELATED RISK FACTORS  [ ] Prior episodes of VTE                                     (3 Points)                [ ] Fracture of the hip, pelvis, or leg                       (5 Points)  [ ] Positive family history for VTE                         (3 Points)             [ ] Acute spinal cord injury (in the previous month)  (5 Points)  [ ] Prothrombin 85943 A                                     (3 Points)               [ ] Paralysis  (less than 1 month)                             (5 Points)  [ ] Factor V Leiden                                             (3 Points)                  [ ] Multiple Trauma within 1 month                        (5 Points)  [ ] Lupus anticoagulants                                     (3 Points)                                                           [ ] Anticardiolipin antibodies                               (3 Points)                                                       [ ] High homocysteine in the blood                      (3 Points)                                             [ ] Other congenital or acquired thrombophilia      (3 Points)                                                [ ] Heparin induced thrombocytopenia                  (3 Points)                                     Total Score [          ] RICHARDI VTE 2.0 SCORE [CLOT updated 2019]    AGE RELATED RISK FACTORS                                                       MOBILITY RELATED FACTORS  [ ] Age 41-60 years                                            (1 Point)                    [ ] Bed rest                                                        (1 Point)  [ ] Age: 61-74 years                                           (2 Points)                  [ ] Plaster cast                                                   (2 Points)  [ ] Age= 75 years                                              (3 Points)                    [ ] Bed bound for more than 72 hours                 (2 Points)    DISEASE RELATED RISK FACTORS                                               GENDER SPECIFIC FACTORS  [ ] Edema in the lower extremities                       (1 Point)              [ ] Pregnancy                                                     (1 Point)  [ ] Varicose veins                                               (1 Point)                     [ ] Post-partum < 6 weeks                                   (1 Point)             [ ] BMI > 25 Kg/m2                                            (1 Point)                     [ ] Hormonal therapy  or oral contraception          (1 Point)                 [ ] Sepsis (in the previous month)                        (1 Point)               [ ] History of pregnancy complications                 (1 point)  [ ] Pneumonia or serious lung disease                                               [ ] Unexplained or recurrent                     (1 Point)           (in the previous month)                               (1 Point)  [ ] Abnormal pulmonary function test                     (1 Point)                 SURGERY RELATED RISK FACTORS  [ ] Acute myocardial infarction                              (1 Point)               [ ]  Section                                             (1 Point)  [ ] Congestive heart failure (in the previous month)  (1 Point)      [ ] Minor surgery                                                  (1 Point)   [ ] Inflammatory bowel disease                             (1 Point)               [ ] Arthroscopic surgery                                        (2 Points)  [ ] Central venous access                                      (2 Points)                [ ] General surgery lasting more than 45 minutes (2 points)  [ ] Malignancy- Present or previous                   (2 Points)                [ ] Elective arthroplasty                                         (5 points)    [ ] Stroke (in the previous month)                          (5 Points)                                                                                                                                                           HEMATOLOGY RELATED FACTORS                                                 TRAUMA RELATED RISK FACTORS  [ ] Prior episodes of VTE                                     (3 Points)                [ ] Fracture of the hip, pelvis, or leg                       (5 Points)  [ ] Positive family history for VTE                         (3 Points)             [ ] Acute spinal cord injury (in the previous month)  (5 Points)  [ ] Prothrombin 76010 A                                     (3 Points)               [ ] Paralysis  (less than 1 month)                             (5 Points)  [ ] Factor V Leiden                                             (3 Points)                  [ ] Multiple Trauma within 1 month                        (5 Points)  [ ] Lupus anticoagulants                                     (3 Points)                                                           [ ] Anticardiolipin antibodies                               (3 Points)                                                       [ ] High homocysteine in the blood                      (3 Points)                                             [ ] Other congenital or acquired thrombophilia      (3 Points)                                                [ ] Heparin induced thrombocytopenia                  (3 Points)                                     Total Score [   4       ]

## 2022-10-04 NOTE — H&P PST ADULT - NSICDXPASTMEDICALHX_GEN_ALL_CORE_FT
PAST MEDICAL HISTORY:  Afib     AS (aortic stenosis)     BPH (benign prostatic hypertrophy)     CAD (coronary artery disease)     COVID-19 4/11/2022: aysmptomatic    HLD (hyperlipidemia)     HTN (hypertension)     Obesity     Peripheral edema

## 2022-10-04 NOTE — H&P PST ADULT - HISTORY OF PRESENT ILLNESS
75 yo M with PMhx HTN, HLD , polycythemia vera ,  moderate AS  CABG with increase in exertional dyspnea with a history of LBBB that developed between September 0f 2020 and September 2021 but after May 2021. No significant new CAD based on recent cath (previous CABG) . A Zlo monitor revealed LBBB with KS prolongation. KS further lengthens with exercise causing further exertional dyspnea. He also has occasional chest pressure with exertion.  ECHO with EF 50-55% with significant dysynchrony and moderate AS . He has a hx of syncope suggestive of dysautonomia.  He presents today for PPM implant. His last cardiac cath was on September 2021 .     Sept 2021 ECHO   1. Calcified trileaflet aortic valve with decreased  opening. Peak transaortic valve gradient equals 58 mm Hg,  mean transaortic valve gradient equals 30 mm Hg, estimated  aortic valve area equals 1.2 sqcm (by continuity equation),  aortic valve velocity time integral equals 88 cm, the DVI=  0.31, consistent with moderate aortic stenosis. Mild aortic  regurgitation.  2. Normal left ventricular size with mild concentric  hypertrophy.  3. There is paradoxical septal motion.  The basal inferior  and inferolateral walls are severely hypokinetic.  The left  ventricular function is overall preserved.  The LVEF=  50-55%.    No fever or chills , no N/V/D or abd pain.  77 yo M with PMhx HTN, HLD , polycythemia vera, moderate AS, CABG x6, LBBB, Afib (Xarelto), PPM (11/2021), Covid (Asymptomatic 4/2022). Pt reports JOEL and intermittent c/p on exertion for several weeks, with minor peripheral edema. Pt denies headaches at this time. Pt evaluated by Dr. Martinez/Jamal for a scheduled TAVR on 10/6/22. Pt denies recent travel, sick contact, or recent covid infection.    Covid swab done on 10/4 at [facility]

## 2022-10-04 NOTE — H&P PST ADULT - FALL HARM RISK - UNIVERSAL INTERVENTIONS
Bed in lowest position, wheels locked, appropriate side rails in place/Call bell, personal items and telephone in reach/Instruct patient to call for assistance before getting out of bed or chair/Non-slip footwear when patient is out of bed/[place] to call system/Physically safe environment - no spills, clutter or unnecessary equipment/Purposeful Proactive Rounding/Room/bathroom lighting operational, light cord in reach

## 2022-10-05 ENCOUNTER — TRANSCRIPTION ENCOUNTER (OUTPATIENT)
Age: 77
End: 2022-10-05

## 2022-10-05 PROBLEM — I35.0 NONRHEUMATIC AORTIC (VALVE) STENOSIS: Chronic | Status: ACTIVE | Noted: 2022-10-04

## 2022-10-05 PROBLEM — U07.1 COVID-19: Chronic | Status: ACTIVE | Noted: 2022-10-04

## 2022-10-05 PROBLEM — I48.91 UNSPECIFIED ATRIAL FIBRILLATION: Chronic | Status: ACTIVE | Noted: 2022-10-04

## 2022-10-06 ENCOUNTER — APPOINTMENT (OUTPATIENT)
Dept: CARDIOTHORACIC SURGERY | Facility: HOSPITAL | Age: 77
End: 2022-10-06

## 2022-10-06 ENCOUNTER — TRANSCRIPTION ENCOUNTER (OUTPATIENT)
Age: 77
End: 2022-10-06

## 2022-10-06 ENCOUNTER — INPATIENT (INPATIENT)
Facility: HOSPITAL | Age: 77
LOS: 0 days | Discharge: HOME | DRG: 267 | End: 2022-10-07
Attending: THORACIC SURGERY (CARDIOTHORACIC VASCULAR SURGERY) | Admitting: THORACIC SURGERY (CARDIOTHORACIC VASCULAR SURGERY)
Payer: MEDICARE

## 2022-10-06 VITALS
TEMPERATURE: 98 F | DIASTOLIC BLOOD PRESSURE: 83 MMHG | HEART RATE: 68 BPM | HEIGHT: 71 IN | RESPIRATION RATE: 18 BRPM | OXYGEN SATURATION: 97 % | WEIGHT: 234.57 LBS | SYSTOLIC BLOOD PRESSURE: 133 MMHG

## 2022-10-06 DIAGNOSIS — Z98.89 OTHER SPECIFIED POSTPROCEDURAL STATES: Chronic | ICD-10-CM

## 2022-10-06 DIAGNOSIS — I35.0 NONRHEUMATIC AORTIC (VALVE) STENOSIS: ICD-10-CM

## 2022-10-06 DIAGNOSIS — Z90.49 ACQUIRED ABSENCE OF OTHER SPECIFIED PARTS OF DIGESTIVE TRACT: Chronic | ICD-10-CM

## 2022-10-06 DIAGNOSIS — Z95.0 PRESENCE OF CARDIAC PACEMAKER: Chronic | ICD-10-CM

## 2022-10-06 DIAGNOSIS — Z95.1 PRESENCE OF AORTOCORONARY BYPASS GRAFT: Chronic | ICD-10-CM

## 2022-10-06 LAB — GLUCOSE BLDC GLUCOMTR-MCNC: 133 MG/DL — HIGH (ref 70–99)

## 2022-10-06 PROCEDURE — 33361 REPLACE AORTIC VALVE PERQ: CPT | Mod: 62,Q0

## 2022-10-06 PROCEDURE — 33361 REPLACE AORTIC VALVE PERQ: CPT | Mod: Q0,62

## 2022-10-06 PROCEDURE — 93010 ELECTROCARDIOGRAM REPORT: CPT | Mod: 77

## 2022-10-06 PROCEDURE — 93306 TTE W/DOPPLER COMPLETE: CPT | Mod: 26

## 2022-10-06 PROCEDURE — 93010 ELECTROCARDIOGRAM REPORT: CPT

## 2022-10-06 DEVICE — GWIRE GUID  0.035INX150CM: Type: IMPLANTABLE DEVICE | Status: FUNCTIONAL

## 2022-10-06 DEVICE — WIRE GD SAFARI2 275CM XSML CRV: Type: IMPLANTABLE DEVICE | Status: FUNCTIONAL

## 2022-10-06 DEVICE — GUIDEWIRE AMPLATZ SUPER-STIFF SHORT TAPER .035" X 260CM: Type: IMPLANTABLE DEVICE | Status: FUNCTIONAL

## 2022-10-06 DEVICE — MANTA VASC CLOS 18FR: Type: IMPLANTABLE DEVICE | Status: FUNCTIONAL

## 2022-10-06 DEVICE — SHEATH INTRODUCER TERUMO PINNACLE CORONARY 8FR X 10CM X 0.038" MINI WIRE: Type: IMPLANTABLE DEVICE | Status: FUNCTIONAL

## 2022-10-06 DEVICE — CATH VASCULAR EXPO VENTRICULAR PIGTAIL CURVE (PIG) 0.045" X 5FR X 100CM: Type: IMPLANTABLE DEVICE | Status: FUNCTIONAL

## 2022-10-06 DEVICE — CATH VASCULAR EXPO EXPO AMPLATZ LEFT CURVE (AL1) 0.045" X 5FR X 100CM: Type: IMPLANTABLE DEVICE | Status: FUNCTIONAL

## 2022-10-06 DEVICE — VALVE TAVR EVOLUT FX 29MM: Type: IMPLANTABLE DEVICE | Status: FUNCTIONAL

## 2022-10-06 DEVICE — INTRODUCER SHEATH DRYSEAL FLEX 18FR X 33CM: Type: IMPLANTABLE DEVICE | Status: FUNCTIONAL

## 2022-10-06 DEVICE — CATH VASCULAR EXPO VENTRICULAR PIGTAIL CURVE (PIG 145) 0.045" X 5FR X 10CM: Type: IMPLANTABLE DEVICE | Status: FUNCTIONAL

## 2022-10-06 DEVICE — WIRE GUIDE EXCHANGE J TIP 3MM X 260CM: Type: IMPLANTABLE DEVICE | Status: FUNCTIONAL

## 2022-10-06 DEVICE — LOADING SYSTEM EVOLUT FX 23-29MM: Type: IMPLANTABLE DEVICE | Status: FUNCTIONAL

## 2022-10-06 DEVICE — PACER KT BLLN FLW DIR 5FR: Type: IMPLANTABLE DEVICE | Status: FUNCTIONAL

## 2022-10-06 DEVICE — SHEATH INTRODUCER TERUMO PINNACLE CORONARY 6FR X 10CM X 0.038" MINI WIRE: Type: IMPLANTABLE DEVICE | Status: FUNCTIONAL

## 2022-10-06 DEVICE — GWIRE STR .038X180 STIFF LONG TAPR: Type: IMPLANTABLE DEVICE | Status: FUNCTIONAL

## 2022-10-06 DEVICE — SUT PERCLOSE PROGLIDE 6FR: Type: IMPLANTABLE DEVICE | Status: FUNCTIONAL

## 2022-10-06 DEVICE — CATH VASCULAR EXPO FEMORAL LEFT CURVE (FL4) 0.045" X 5FR X 100CM: Type: IMPLANTABLE DEVICE | Status: FUNCTIONAL

## 2022-10-06 DEVICE — BLLN Z-MED II 23MMX4X100CM: Type: IMPLANTABLE DEVICE | Status: FUNCTIONAL

## 2022-10-06 DEVICE — CATH TAVR EVOLUT FX 14FR 23-29MM: Type: IMPLANTABLE DEVICE | Status: FUNCTIONAL

## 2022-10-06 RX ORDER — FINASTERIDE 5 MG/1
5 TABLET, FILM COATED ORAL DAILY
Refills: 0 | Status: DISCONTINUED | OUTPATIENT
Start: 2022-10-06 | End: 2022-10-07

## 2022-10-06 RX ORDER — TAMSULOSIN HYDROCHLORIDE 0.4 MG/1
0.4 CAPSULE ORAL AT BEDTIME
Refills: 0 | Status: DISCONTINUED | OUTPATIENT
Start: 2022-10-06 | End: 2022-10-07

## 2022-10-06 RX ORDER — MECLIZINE HCL 12.5 MG
1 TABLET ORAL
Qty: 0 | Refills: 0 | DISCHARGE

## 2022-10-06 RX ORDER — FOLIC ACID 0.8 MG
1 TABLET ORAL AT BEDTIME
Refills: 0 | Status: DISCONTINUED | OUTPATIENT
Start: 2022-10-06 | End: 2022-10-07

## 2022-10-06 RX ORDER — NITROGLYCERIN 6.5 MG
0.4 CAPSULE, EXTENDED RELEASE ORAL
Qty: 0 | Refills: 0 | DISCHARGE

## 2022-10-06 RX ORDER — ACETAMINOPHEN 500 MG
650 TABLET ORAL EVERY 6 HOURS
Refills: 0 | Status: DISCONTINUED | OUTPATIENT
Start: 2022-10-06 | End: 2022-10-07

## 2022-10-06 RX ORDER — LIDOCAINE HCL 20 MG/ML
0.2 VIAL (ML) INJECTION ONCE
Refills: 0 | Status: DISCONTINUED | OUTPATIENT
Start: 2022-10-06 | End: 2022-10-06

## 2022-10-06 RX ORDER — ALUMINUM HYDROXIDE AND MAGNESIUM TRISILICATE 80; 14.2 MG/1; MG/1
2 TABLET, CHEWABLE ORAL
Qty: 0 | Refills: 0 | DISCHARGE

## 2022-10-06 RX ORDER — SODIUM CHLORIDE 9 MG/ML
3 INJECTION INTRAMUSCULAR; INTRAVENOUS; SUBCUTANEOUS EVERY 8 HOURS
Refills: 0 | Status: DISCONTINUED | OUTPATIENT
Start: 2022-10-06 | End: 2022-10-06

## 2022-10-06 RX ORDER — FINASTERIDE 5 MG/1
1 TABLET, FILM COATED ORAL
Qty: 0 | Refills: 0 | DISCHARGE

## 2022-10-06 RX ORDER — CEFUROXIME AXETIL 250 MG
1500 TABLET ORAL EVERY 8 HOURS
Refills: 0 | Status: COMPLETED | OUTPATIENT
Start: 2022-10-06 | End: 2022-10-06

## 2022-10-06 RX ORDER — ASPIRIN/CALCIUM CARB/MAGNESIUM 324 MG
81 TABLET ORAL DAILY
Refills: 0 | Status: DISCONTINUED | OUTPATIENT
Start: 2022-10-06 | End: 2022-10-07

## 2022-10-06 RX ORDER — RIVAROXABAN 15 MG-20MG
20 KIT ORAL DAILY
Refills: 0 | Status: DISCONTINUED | OUTPATIENT
Start: 2022-10-07 | End: 2022-10-07

## 2022-10-06 RX ORDER — ATENOLOL 25 MG/1
12.5 TABLET ORAL DAILY
Refills: 0 | Status: DISCONTINUED | OUTPATIENT
Start: 2022-10-06 | End: 2022-10-07

## 2022-10-06 RX ORDER — CHOLECALCIFEROL (VITAMIN D3) 125 MCG
1 CAPSULE ORAL
Qty: 0 | Refills: 0 | DISCHARGE

## 2022-10-06 RX ORDER — ISOSORBIDE MONONITRATE 60 MG/1
60 TABLET, EXTENDED RELEASE ORAL DAILY
Refills: 0 | Status: DISCONTINUED | OUTPATIENT
Start: 2022-10-06 | End: 2022-10-07

## 2022-10-06 RX ORDER — HYDROXYUREA 500 MG/1
1 CAPSULE ORAL
Qty: 0 | Refills: 0 | DISCHARGE

## 2022-10-06 RX ORDER — ISOSORBIDE MONONITRATE 60 MG/1
1 TABLET, EXTENDED RELEASE ORAL
Qty: 0 | Refills: 0 | DISCHARGE

## 2022-10-06 RX ORDER — SIMVASTATIN 20 MG/1
1 TABLET, FILM COATED ORAL
Qty: 0 | Refills: 0 | DISCHARGE

## 2022-10-06 RX ORDER — PANTOPRAZOLE SODIUM 20 MG/1
40 TABLET, DELAYED RELEASE ORAL
Refills: 0 | Status: DISCONTINUED | OUTPATIENT
Start: 2022-10-06 | End: 2022-10-07

## 2022-10-06 RX ORDER — SIMVASTATIN 20 MG/1
10 TABLET, FILM COATED ORAL AT BEDTIME
Refills: 0 | Status: DISCONTINUED | OUTPATIENT
Start: 2022-10-06 | End: 2022-10-07

## 2022-10-06 RX ORDER — FAMOTIDINE 10 MG/ML
1 INJECTION INTRAVENOUS
Qty: 0 | Refills: 0 | DISCHARGE

## 2022-10-06 RX ORDER — HYDROXYUREA 500 MG/1
500 CAPSULE ORAL DAILY
Refills: 0 | Status: DISCONTINUED | OUTPATIENT
Start: 2022-10-06 | End: 2022-10-07

## 2022-10-06 RX ORDER — FAMOTIDINE 10 MG/ML
40 INJECTION INTRAVENOUS DAILY
Refills: 0 | Status: DISCONTINUED | OUTPATIENT
Start: 2022-10-06 | End: 2022-10-07

## 2022-10-06 RX ORDER — ASPIRIN/CALCIUM CARB/MAGNESIUM 324 MG
1 TABLET ORAL
Qty: 0 | Refills: 0 | DISCHARGE

## 2022-10-06 RX ORDER — POTASSIUM CHLORIDE 20 MEQ
10 PACKET (EA) ORAL DAILY
Refills: 0 | Status: DISCONTINUED | OUTPATIENT
Start: 2022-10-06 | End: 2022-10-07

## 2022-10-06 RX ORDER — CHLORHEXIDINE GLUCONATE 213 G/1000ML
1 SOLUTION TOPICAL ONCE
Refills: 0 | Status: DISCONTINUED | OUTPATIENT
Start: 2022-10-06 | End: 2022-10-06

## 2022-10-06 RX ORDER — RIVAROXABAN 15 MG-20MG
1 KIT ORAL
Qty: 0 | Refills: 0 | DISCHARGE

## 2022-10-06 RX ORDER — POTASSIUM CHLORIDE 20 MEQ
1 PACKET (EA) ORAL
Qty: 0 | Refills: 0 | DISCHARGE

## 2022-10-06 RX ORDER — PANTOPRAZOLE SODIUM 20 MG/1
1 TABLET, DELAYED RELEASE ORAL
Qty: 0 | Refills: 0 | DISCHARGE

## 2022-10-06 RX ORDER — TAMSULOSIN HYDROCHLORIDE 0.4 MG/1
1 CAPSULE ORAL
Qty: 0 | Refills: 0 | DISCHARGE

## 2022-10-06 RX ORDER — FOLIC ACID 0.8 MG
1 TABLET ORAL
Qty: 0 | Refills: 0 | DISCHARGE

## 2022-10-06 RX ADMIN — Medication 650 MILLIGRAM(S): at 19:56

## 2022-10-06 RX ADMIN — Medication 650 MILLIGRAM(S): at 20:26

## 2022-10-06 RX ADMIN — SIMVASTATIN 10 MILLIGRAM(S): 20 TABLET, FILM COATED ORAL at 19:35

## 2022-10-06 RX ADMIN — Medication 10 MILLIEQUIVALENT(S): at 19:35

## 2022-10-06 RX ADMIN — Medication 100 MILLIGRAM(S): at 23:27

## 2022-10-06 RX ADMIN — PANTOPRAZOLE SODIUM 40 MILLIGRAM(S): 20 TABLET, DELAYED RELEASE ORAL at 12:30

## 2022-10-06 RX ADMIN — ISOSORBIDE MONONITRATE 60 MILLIGRAM(S): 60 TABLET, EXTENDED RELEASE ORAL at 19:35

## 2022-10-06 RX ADMIN — ATENOLOL 12.5 MILLIGRAM(S): 25 TABLET ORAL at 19:56

## 2022-10-06 RX ADMIN — HYDROXYUREA 500 MILLIGRAM(S): 500 CAPSULE ORAL at 12:30

## 2022-10-06 RX ADMIN — Medication 100 MILLIGRAM(S): at 16:15

## 2022-10-06 RX ADMIN — Medication 81 MILLIGRAM(S): at 18:55

## 2022-10-06 RX ADMIN — FAMOTIDINE 40 MILLIGRAM(S): 10 INJECTION INTRAVENOUS at 12:30

## 2022-10-06 RX ADMIN — Medication 1 MILLIGRAM(S): at 19:35

## 2022-10-06 NOTE — CHART NOTE - NSCHARTNOTEFT_GEN_A_CORE
s/p TAVR Medtronic Evolute    Transfemoral  18 Fr Right Manta arterial closure  Left 6 Fr arterial perclose  Left 8 Fr venous- manual hold    Zinacef given at 8am: 2 more doses  ASA today  Xarelto 10/7/2022  lasix restart? discuss with Dr Berry or TAVR in the AM    Anesthesia:  conscious sedation  fent/ precedex

## 2022-10-06 NOTE — BRIEF OPERATIVE NOTE - COMMENTS
29 Medtronic TAVR valve, well seated on ECHO  R fem arterial closure with Mantra device  L fem venous held with manual pressure

## 2022-10-06 NOTE — HISTORY OF PRESENT ILLNESS
[FreeTextEntry1] : Mr. Sanders is a 76 year old male referred by Dr. Jorge Tellez for evaluation of aortic stenosis. He has a known history of moderate AS on TTE last September and reports a recent progression of exertional dyspnea with chronic chest heaviness after walking one block, fatigue, and intermittent LE edema. He notes that over the past two weeks the dyspnea has worsened and he is unable to vacuum his living room without becoming short of breath. He is having trouble wearing his usual shoes due to edema. His Furosemide was increased to 40mg BID two weeks ago with slight improvement in edema. He routinely sleeps with 5 pillows for comfort (post nasal drip); he is able to lay flat. \par \par His past medical history includes ICM s/p CABG x6 20 years ago here at [place], AFib on Xarelto, pSVT, PPM placement in November 2021 (followed by Dr. Delacruz) and GERD. He was recently diagnosed with polycythemia vera and has had phlebotomy and subsequently was placed on hydroxyurea. He had COVID in April though was asymptomatic.\par \par Repeat echocardiogram today demonstrates severe aortic stenosis with an TRISTAN of 0.8 sqcm, peak and mean gradients of 66 and 34 mmHg, AoV 4.1 m/s, and a DVI of 0.20; mild aortic insufficiency and mildly reduced LV function were also noted (LVEF 45% with basal inferior and inferolateral hypokinesis--consistent with his history of CAD, remote IWMI, and a prior CABG). \par \par As noted, over the past few weeks Mr Sanders reports a marked increase in JOEL and deterioration of his functional capacity. He describes months of "a tightness or a pressure" in his chest that he believes is similar to his acid reflux, but he notes he is unsure if it may be cardiac in nature. It is brought on by exertion and only rarely with eating. He feels fatigued, but notes this symptom developed after he started Hydroxyurea. He reports a history of "vertigo very rarely" but no syncope. He does describe historical symptoms that sound consistent with a vasovagal syndrome--but has never syncopized. He notes "if I don't look I'm fine" with respect to getting his blood drawn. Other than the addition of Xarelto in late 2021 (for PAF which was detected by his PPM), he reports his cardiac regimen has been stable "for 20 years" (other than the uptitration of his Lasix from 40mg to 80mg a few weeks ago).

## 2022-10-06 NOTE — PHYSICAL EXAM
[Well Developed] : well developed [Well Nourished] : well nourished [Normal Rate] : normal [Rhythm Regular] : regular [II] : a grade 2 [___ +] : bilateral [unfilled]U+ pretibial pitting edema [Soft] : abdomen soft [Non Tender] : non-tender [Normal Gait] : normal gait [Normal] : alert and oriented, normal memory [Right Carotid Bruit] : no bruit heard over the right carotid [Left Carotid Bruit] : no bruit heard over the left carotid [de-identified] : diminished at bases bilaterally [de-identified] : pretibial edema bilaterally

## 2022-10-06 NOTE — PRE-OP CHECKLIST - WARM FLUIDS/WARM BLANKETS
Noted by nursing staff - no known history of dementia and usually alert and oriented x3  On admission, patient is oriented x3 so appears at her baseline  It is unclear if the patient was wearing her oxygen that she is supposed to wear 24 hours at the time the mental status change, possible mental status change due to hypoxia  CT head is unremarkable  MRI head  - showing old ischemic disease  no

## 2022-10-06 NOTE — PATIENT PROFILE ADULT - FALL HARM RISK - UNIVERSAL INTERVENTIONS
Bed in lowest position, wheels locked, appropriate side rails in place/Call bell, personal items and telephone in reach/Instruct patient to call for assistance before getting out of bed or chair/Non-slip footwear when patient is out of bed/[place] to call system/Physically safe environment - no spills, clutter or unnecessary equipment/Purposeful Proactive Rounding/Room/bathroom lighting operational, light cord in reach

## 2022-10-06 NOTE — CARDIOLOGY SUMMARY
[de-identified] : None today; V paced 80 prior [de-identified] : 9/30/21\par EF 50-55%, TRISTAN 1.2 sqcm, mGr 30 mmHg, pGr 58 mmHg [de-identified] : 10/11/21\par Coronary Angiography\par Left main artery: mild luminal disease.\par Mid left anterior descending: LIMA to mid LAD widely patent. There is a 100 % stenosis. First diagonal: present in 2017 small\par distal territory. There is an 80 % stenosis. Lateral first diagonal: SVG to this large diagonal is widely patent. There is a 100 %\par stenosis.\par Mid circumflex: graft to distal Cx occluded collaterals from graft to lateral diagonal fills the distal Cx. There is a 100 % stenosis.\par First obtuse marginal: mild luminal disease SVG to OM1 is occluded.\par Proximal right coronary artery: SVG to distal RCA is occluded. There is a 100 % stenosis.\par \par Graft Angiography\par LIMA graft to Mid left anterior descending: Angiography shows no disease.\par SVG graft to Lateral first diagonal: Angiography shows minor irregularities. large vessel fills the distal Cx.\par SVG graft to Distal right coronary artery: Angiography shows complete occlusion.\par SVG graft to First obtuse marginal: Angiography shows complete occlusion. the OM1 is free of disease.

## 2022-10-06 NOTE — PATIENT PROFILE ADULT - FUNCTIONAL ASSESSMENT - BASIC MOBILITY SCORE.
Patient attempting to removes tubes, climb over side rails, hitting,kicking and yelling at staff. Repositioned several times, assisted to bsc and then a recliner x 2 staff but she did not tolerate recliner well so she was assisted back to bed. This RN remains at bedside to keep patient calm and safe. ON call MD made aware. 24

## 2022-10-06 NOTE — REVIEW OF SYSTEMS
[Feeling Fatigued] : feeling fatigued [Dyspnea on exertion] : dyspnea during exertion [Chest Discomfort] : chest discomfort [Lower Ext Edema] : lower extremity edema [Negative] : Psychiatric [Fever] : no fever [Chills] : no chills [Palpitations] : no palpitations [Orthopnea] : no orthopnea [PND] : no PND [Abdominal Pain] : no abdominal pain [Change in Appetite] : no change in appetite [Dizziness] : no dizziness [FreeTextEntry4] : no active dental concerns [de-identified] : see HPI

## 2022-10-06 NOTE — DISCUSSION/SUMMARY
[FreeTextEntry1] : Mr Tommy has severe AS with recently progressive symptoms, as noted (NYHA II). Dr Martinez and I agree that he can proceed without repeat angiography, as it was done in October 2021 and demonstrated a patent LIMA to the LAD with collaterals to the distal RCA and LCX. Discussed the potential risks and benefits of SCOT in detail, he wishes to proceed. As such, he will have a cardiac CT and be given a tentative date for his SCOT. All questions were answered in detail and I have notified Dr Tellez of our impressions and plan.\par

## 2022-10-07 ENCOUNTER — TRANSCRIPTION ENCOUNTER (OUTPATIENT)
Age: 77
End: 2022-10-07

## 2022-10-07 VITALS
HEART RATE: 60 BPM | RESPIRATION RATE: 17 BRPM | DIASTOLIC BLOOD PRESSURE: 81 MMHG | SYSTOLIC BLOOD PRESSURE: 137 MMHG | OXYGEN SATURATION: 94 % | TEMPERATURE: 98 F

## 2022-10-07 LAB
ANION GAP SERPL CALC-SCNC: 11 MMOL/L — SIGNIFICANT CHANGE UP (ref 5–17)
BASOPHILS # BLD AUTO: 0.06 K/UL — SIGNIFICANT CHANGE UP (ref 0–0.2)
BASOPHILS NFR BLD AUTO: 0.5 % — SIGNIFICANT CHANGE UP (ref 0–2)
BUN SERPL-MCNC: 16 MG/DL — SIGNIFICANT CHANGE UP (ref 7–23)
CALCIUM SERPL-MCNC: 9.4 MG/DL — SIGNIFICANT CHANGE UP (ref 8.4–10.5)
CHLORIDE SERPL-SCNC: 105 MMOL/L — SIGNIFICANT CHANGE UP (ref 96–108)
CO2 SERPL-SCNC: 25 MMOL/L — SIGNIFICANT CHANGE UP (ref 22–31)
CREAT SERPL-MCNC: 1.33 MG/DL — HIGH (ref 0.5–1.3)
EGFR: 56 ML/MIN/1.73M2 — LOW
EOSINOPHIL # BLD AUTO: 0.11 K/UL — SIGNIFICANT CHANGE UP (ref 0–0.5)
EOSINOPHIL NFR BLD AUTO: 0.9 % — SIGNIFICANT CHANGE UP (ref 0–6)
GLUCOSE SERPL-MCNC: 116 MG/DL — HIGH (ref 70–99)
HCT VFR BLD CALC: 40.6 % — SIGNIFICANT CHANGE UP (ref 39–50)
HGB BLD-MCNC: 13.7 G/DL — SIGNIFICANT CHANGE UP (ref 13–17)
IMM GRANULOCYTES NFR BLD AUTO: 1.3 % — HIGH (ref 0–0.9)
LYMPHOCYTES # BLD AUTO: 1.41 K/UL — SIGNIFICANT CHANGE UP (ref 1–3.3)
LYMPHOCYTES # BLD AUTO: 11.7 % — LOW (ref 13–44)
MCHC RBC-ENTMCNC: 32 PG — SIGNIFICANT CHANGE UP (ref 27–34)
MCHC RBC-ENTMCNC: 33.7 GM/DL — SIGNIFICANT CHANGE UP (ref 32–36)
MCV RBC AUTO: 94.9 FL — SIGNIFICANT CHANGE UP (ref 80–100)
MONOCYTES # BLD AUTO: 1.51 K/UL — HIGH (ref 0–0.9)
MONOCYTES NFR BLD AUTO: 12.6 % — SIGNIFICANT CHANGE UP (ref 2–14)
NEUTROPHILS # BLD AUTO: 8.77 K/UL — HIGH (ref 1.8–7.4)
NEUTROPHILS NFR BLD AUTO: 73 % — SIGNIFICANT CHANGE UP (ref 43–77)
NRBC # BLD: 0 /100 WBCS — SIGNIFICANT CHANGE UP (ref 0–0)
PLATELET # BLD AUTO: 174 K/UL — SIGNIFICANT CHANGE UP (ref 150–400)
POTASSIUM SERPL-MCNC: 4.2 MMOL/L — SIGNIFICANT CHANGE UP (ref 3.5–5.3)
POTASSIUM SERPL-SCNC: 4.2 MMOL/L — SIGNIFICANT CHANGE UP (ref 3.5–5.3)
RBC # BLD: 4.28 M/UL — SIGNIFICANT CHANGE UP (ref 4.2–5.8)
RBC # FLD: 13.5 % — SIGNIFICANT CHANGE UP (ref 10.3–14.5)
SODIUM SERPL-SCNC: 141 MMOL/L — SIGNIFICANT CHANGE UP (ref 135–145)
WBC # BLD: 12.02 K/UL — HIGH (ref 3.8–10.5)
WBC # FLD AUTO: 12.02 K/UL — HIGH (ref 3.8–10.5)

## 2022-10-07 PROCEDURE — 99232 SBSQ HOSP IP/OBS MODERATE 35: CPT

## 2022-10-07 PROCEDURE — 93306 TTE W/DOPPLER COMPLETE: CPT | Mod: 26

## 2022-10-07 RX ORDER — FUROSEMIDE 40 MG
1 TABLET ORAL
Qty: 0 | Refills: 0 | DISCHARGE

## 2022-10-07 RX ORDER — ACETAMINOPHEN 500 MG
2 TABLET ORAL
Qty: 0 | Refills: 0 | DISCHARGE
Start: 2022-10-07

## 2022-10-07 RX ORDER — FUROSEMIDE 40 MG
1 TABLET ORAL
Qty: 30 | Refills: 1
Start: 2022-10-07 | End: 2022-12-05

## 2022-10-07 RX ADMIN — Medication 81 MILLIGRAM(S): at 06:10

## 2022-10-07 RX ADMIN — FINASTERIDE 5 MILLIGRAM(S): 5 TABLET, FILM COATED ORAL at 06:10

## 2022-10-07 RX ADMIN — TAMSULOSIN HYDROCHLORIDE 0.4 MILLIGRAM(S): 0.4 CAPSULE ORAL at 06:10

## 2022-10-07 RX ADMIN — RIVAROXABAN 20 MILLIGRAM(S): KIT at 11:50

## 2022-10-07 RX ADMIN — PANTOPRAZOLE SODIUM 40 MILLIGRAM(S): 20 TABLET, DELAYED RELEASE ORAL at 06:10

## 2022-10-07 NOTE — PROGRESS NOTE ADULT - SUBJECTIVE AND OBJECTIVE BOX
This patient has been implanted with a Transcatheter Aortic Valve Implant under the following NCT/GODWIN:    TAVR:  Commercial Implant NCT [id number], GODWIN N/A and will be placed into the TVT Registry.        ALDO Tatum  [id number]
*****Structural Heart Team*****    Subjective:    Patient resting comfortably in bed, currently offering no complaints. He states that he had mild SOB last night when he walked to the bathroom, but it was much better than before his TAVR.    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    S/P hernia surgery  inguinal hernia  repair at 10 yrs of age    S/P tonsillectomy  as a child    S/P cholecystectomy    Cardiac pacemaker  2021          T(C): 36.6 (10-07-22 @ 09:23), Max: 36.7 (10-07-22 @ 03:25)  HR: 60 (10-07-22 @ 09:23) (60 - 74)  BP: 128/53 (10-07-22 @ 09:23) (106/60 - 138/75)  RR: 17 (10-07-22 @ 09:23) (13 - 18)  SpO2: 94% (10-07-22 @ 09:23) (94% - 99%)  Wt(kg): --  10-06 @ 07:01  -  10-07 @ 07:00  --------------------------------------------------------  IN: 240 mL / OUT: 0 mL / NET: 240 mL    10-07 @ 07:01  -  10-07 @ 10:31  --------------------------------------------------------  IN: 240 mL / OUT: 0 mL / NET: 240 mL      MEDICATIONS  (STANDING):  aspirin enteric coated 81 milliGRAM(s) Oral daily  ATENolol  Tablet 12.5 milliGRAM(s) Oral daily  cefuroxime  IVPB 1500 milliGRAM(s) IV Intermittent once  famotidine    Tablet 40 milliGRAM(s) Oral daily  finasteride 5 milliGRAM(s) Oral daily  folic acid 1 milliGRAM(s) Oral at bedtime  hydroxyurea 500 milliGRAM(s) Oral daily  isosorbide   mononitrate ER Tablet (IMDUR) 60 milliGRAM(s) Oral daily  pantoprazole    Tablet 40 milliGRAM(s) Oral before breakfast  potassium chloride    Tablet ER 10 milliEquivalent(s) Oral daily  rivaroxaban 20 milliGRAM(s) Oral daily  simvastatin 10 milliGRAM(s) Oral at bedtime  tamsulosin 0.4 milliGRAM(s) Oral at bedtime    MEDICATIONS  (PRN):  acetaminophen     Tablet .. 650 milliGRAM(s) Oral every 6 hours PRN Mild Pain (1 - 3)      Review of Symptoms:  Constitutional: Awake, Alert, Follows commands  Respiratory: Mild SOB  Cardiac: Denies CP, Denies Palpitations  Gastrointestinal: Denies Pain, Denies N/V, tolerating po intake  Vascular: Negative  Extremities: No Edema, No joint pain or swelling  Neurological: Negative  Endocrine: No heat or cold intolerance, No excessive thirst  Heme/Onc: Negative    Exam:  General: A/Ox3, EMMA, NAD  HEENT: Supple, No JVD, Trachea midline, no masses  Pulmonary: CTAB, = Chest Excursion, no accessory muscle use  Cor: S1S2, RRR, No Murmur, no Rub  ECG: Paced  Groin/Wound: R groin tender, but soft, no Hematoma B/L  Gastrointestinal: Soft, NT/ND, + Bowel Sounds  Neuro: = motor and sensory B/L, No focal deficits  Vascular: 1+ Pulses B/L, No Edema  Extremities: No Joint pain or swelling  Skin: Warm/Dry/Normal color, Normal turgor, no rashes                          13.7   12.02 )-----------( 174      ( 07 Oct 2022 01:14 )             40.6   10-07    141  |  105  |  16  ----------------------------<  116<H>  4.2   |  25  |  1.33<H>    Ca    9.4      07 Oct 2022 01:14        Imaging Reviewed:    Echocardiogram:  < from: TTE with Doppler (w/Cont) (10.07.22 @ 06:09) >  EF (Modified Powers Rule): 52 %Doppler Peak Velocity  (m/sec): AoV=2.1  ------------------------------------------------------------------------  Observations:  Mitral Valve: Mitral annular calcification, otherwise  normal mitral valve. Minimal mitral regurgitation.  Aortic Valve/Aorta: Transcatheter aortic valve replacement.   The valve is well seated.  Peak transaortic valve gradient  equals 18 mm Hg, mean transaortic valve gradient equals 9  mm Hg, the DI is 0.5 which is probably normal in the  presence of a transcatheter aortic valve replacement.  Minimal paravalvular aortic regurgitation.  No aortic  transvalvular regurgitation seen. Peak left ventricular  outflow tract gradient equals 5 mm Hg, mean gradient is  equal to 3 mm Hg, LVOT velocity time integral equals 24 cm.  Aortic Root: 3.5 cm.  Ascending Aorta: 3 cm.  LVOT diameter: 2 cm.  Left Atrium: Normal left atrium.  LA volume index = 20  cc/m2.  Left Ventricle: Mild segmental left ventricular systolic  dysfunction. Endocardial visualization enhanced with  intravenous injection of Ultrasonic Enhancing Agent  (Lumason). No left ventricular thrombus. The mid  inferolateral wall, the basal inferior wall, and the mid  inferior wall are hypokinetic.  Normal left ventricular  internal dimensions and wall thicknesses. Moderate  diastolic dysfunction (Stage II).  Right Heart: Mild right atrial enlargement. Right  ventricular enlargement with normal right ventricular  systolic function. The right ventricle measures 5.5cm at  the base and 4.4 cm mid RV.  Normal tricuspid valve. Mild  tricuspid regurgitation. Normal pulmonic valve. Minimal  pulmonic regurgitation.  Pericardium/Pleura: Normal pericardium with no pericardial  effusion.  Hemodynamic: Estimated right atrial pressure is 8 mm Hg.  Estimated right ventricular systolic pressure equals 33 mm  Hg, assuming right atrial pressure equals 8 mm Hg,  consistent with normal pulmonary pressures.  ------------------------------------------------------------------------  Conclusions:  1. Mitral annular calcification, otherwise normal mitral  valve.  2. Transcatheter aortic valve replacement.  The valve is  well seated.  Peak transaortic valve gradient equals 18 mm  Hg, mean transaortic valve gradient equals 9 mm Hg, the DI  is 0.5 which is probably normal in the presence of a  transcatheter aortic valve replacement. Minimal  paravalvular aortic regurgitation.  No aortic transvalvular  regurgitation seen.  3. Mild segmental left ventricular systolic dysfunction.  Endocardial visualization enhanced with intravenous  injection of Ultrasonic Enhancing Agent (Lumason). No left  ventricular thrombus. The mid inferolateral wall, the basal  inferior wall, and the mid inferior wall are hypokinetic.  4. Moderate diastolic dysfunction (Stage II).  5. Right ventricular enlargement with normal right  ventricular systolic function. The right ventricle measures  5.5cm at the base and 4.4 cm mid RV.  *** Compared with echocardiogram of 10/6/2022, there has  been an interval TAVR.    < end of copied text >          Assesment/Plan:    76 Male S/P TAVR for severe Aortic Stenosis, Chronic AFib, Chronic Combined Heart Failure  1.) S/P TAVR: ASA 81 mg po daily, Continue to Monitor Groins. Ambulate as tolerated. Post op TTE from today shows valve well seated, minimal PVL and DVi= 0.5, No Transvalvular AI  2.) Patient will not be discharged on an MCOT as he has a PPM  3.) Chronic AFib: Patient currently paced. Rivaroxaban restarted.  4.) Chronic Combined Heart Failure: Continue with Atenolol. Would discharge patient on furosemide 20 mg po daily, which is half of usual dose. Dosing to be re-evaluated at follow up.   5.) Discharge Plan: Patient is to follow up with Dr. Martinez on 10/11/22 at 130 pm. HE should then follow up with the [facility]ve [facility]  in 30 days, with a repeat Transthoracic Echo to be done at that visit.    ALDO Contreras  [id number]
This patient has been implanted with a Transcatheter Aortic Valve Implant under the following NCT/GODWIN:    TAVR:  Commercial Implant NCT [id number], GODWIN N/A and will be placed into the TVT Registry.      ALDO Tatum  06415

## 2022-10-07 NOTE — DISCHARGE NOTE PROVIDER - CARE PROVIDERS DIRECT ADDRESSES
[email or web address]

## 2022-10-07 NOTE — DISCHARGE NOTE NURSING/CASE MANAGEMENT/SOCIAL WORK - NSDCPEFALRISK_GEN_ALL_CORE
For information on Fall & Injury Prevention, visit: [email or web address] OR  [email or web address] OR  https://www.cdc.gov/steadi/patient.html

## 2022-10-07 NOTE — DISCHARGE NOTE PROVIDER - PROVIDER TOKENS
PROVIDER:[TOKEN:[[id number]],SCHEDULEDAPPT:[10/11/2022],SCHEDULEDAPPTTIME:[08:45 AM]] PROVIDER:[TOKEN:[[id number]],SCHEDULEDAPPT:[10/11/2022],SCHEDULEDAPPTTIME:[08:45 AM]] PROVIDER:[TOKEN:[[id number]],SCHEDULEDAPPT:[10/11/2022],SCHEDULEDAPPTTIME:[01:30 PM]]

## 2022-10-07 NOTE — DISCHARGE NOTE PROVIDER - NSDCMRMEDTOKEN_GEN_ALL_CORE_FT
acetaminophen 325 mg oral tablet: 2 tab(s) orally every 6 hours, As needed, Mild Pain (1 - 3)  atenolol: 12.5 milligram(s) orally once a day  Ecotrin Adult Low Strength 81 mg oral delayed release tablet: 1 tab(s) orally once a day (at bedtime)  famotidine 40 mg oral tablet: 1 tab(s) orally once a day (at bedtime)  finasteride 5 mg oral tablet: 1 tab(s) orally once a day (at bedtime)  folic acid 1 mg oral tablet: 1 tab(s) orally once a day (at bedtime)  furosemide 40 mg oral tablet: 1 tab(s) orally once a day (at bedtime)  Gaviscon 80 mg-14.2 mg oral tablet, chewable: 2 tab(s) orally 4 times a day (after meals and at bedtime), As Needed  hydroxyurea 500 mg oral capsule: 1 cap(s) orally once a day  isosorbide mononitrate 60 mg oral tablet, extended release: 1 tab(s) orally once a day (at bedtime)  Klor-Con 10 mEq oral tablet, extended release: 1 tab(s) orally once a day (at bedtime)  meclizine 25 mg oral tablet: 1 tab(s) orally 3 times a day, As Needed  nitroglycerin: 0.4 milligram(s) buccal 4 times a day, As Needed  pantoprazole 40 mg oral delayed release tablet: 1 tab(s) orally once a day  simvastatin 10 mg oral tablet: 1 tab(s) orally once a day (at bedtime)  tamsulosin 0.4 mg oral capsule: 1 cap(s) orally once a day (at bedtime)  Vitamin D3 125 mcg (5000 intl units) oral capsule: 1 cap(s) orally once a day  Xarelto 20 mg oral tablet: 1 tab(s) orally once a day (in the evening)   acetaminophen 325 mg oral tablet: 2 tab(s) orally every 6 hours, As needed, Mild Pain (1 - 3)  atenolol: 12.5 milligram(s) orally once a day  Ecotrin Adult Low Strength 81 mg oral delayed release tablet: 1 tab(s) orally once a day (at bedtime)  famotidine 40 mg oral tablet: 1 tab(s) orally once a day (at bedtime)  finasteride 5 mg oral tablet: 1 tab(s) orally once a day (at bedtime)  folic acid 1 mg oral tablet: 1 tab(s) orally once a day (at bedtime)  Gaviscon 80 mg-14.2 mg oral tablet, chewable: 2 tab(s) orally 4 times a day (after meals and at bedtime), As Needed  hydroxyurea 500 mg oral capsule: 1 cap(s) orally once a day  isosorbide mononitrate 60 mg oral tablet, extended release: 1 tab(s) orally once a day (at bedtime)  Klor-Con 10 mEq oral tablet, extended release: 1 tab(s) orally once a day (at bedtime)  Lasix 20 mg oral tablet: 1 tab(s) orally once a day   meclizine 25 mg oral tablet: 1 tab(s) orally 3 times a day, As Needed  nitroglycerin: 0.4 milligram(s) buccal 4 times a day, As Needed  pantoprazole 40 mg oral delayed release tablet: 1 tab(s) orally once a day  simvastatin 10 mg oral tablet: 1 tab(s) orally once a day (at bedtime)  tamsulosin 0.4 mg oral capsule: 1 cap(s) orally once a day (at bedtime)  Vitamin D3 125 mcg (5000 intl units) oral capsule: 1 cap(s) orally once a day  Xarelto 20 mg oral tablet: 1 tab(s) orally once a day (in the evening)

## 2022-10-07 NOTE — DISCHARGE NOTE PROVIDER - CARE PROVIDER_API CALL
Bandar Berry)  Interventional Cardiology  [address] [place] [address]  Phone: [phone]  Fax: [phone]  Scheduled Appointment: 10/11/2022 08:45 AM   Daniel Martinez)  Thoracic and Cardiac Surgery  [address] [place] [address]  Phone: [phone]  Fax: [phone]  Scheduled Appointment: 10/11/2022 08:45 AM   Daniel Martinez)  Thoracic and Cardiac Surgery  [address] [place] [address]  Phone: [phone]  Fax: [phone]  Scheduled Appointment: 10/11/2022 01:30 PM

## 2022-10-07 NOTE — DISCHARGE NOTE NURSING/CASE MANAGEMENT/SOCIAL WORK - PATIENT PORTAL LINK FT
You can access the FollowMyHealth Patient Portal offered by [facility] by registering at the following website: [email or web address]. By joining [employer]’s FollowMyHealth portal, you will also be able to view your health information using other applications (apps) compatible with our system.

## 2022-10-07 NOTE — DISCHARGE NOTE PROVIDER - NSDCFUSCHEDAPPT_GEN_ALL_CORE_FT
Jorge Tellez  [facility] Physician [facility]  CARDIOLOGY [address] [facility]   Scheduled Appointment: 10/21/2022    [facility]  ELECTROPH [address]  Scheduled Appointment: 11/08/2022     Daniel Martinez  [facility]  CTSURG [address]  Scheduled Appointment: 10/11/2022    Jorge Tellez  [facility]  CARDIOLOGY [address] [facility]   Scheduled Appointment: 10/21/2022    [facility]  ELECTROPH [address]  Scheduled Appointment: 11/08/2022

## 2022-10-07 NOTE — DISCHARGE NOTE PROVIDER - NSDCCPTREATMENT_GEN_ALL_CORE_FT
PRINCIPAL PROCEDURE  Procedure: TAVR, percutaneous  Findings and Treatment: 29 Medtronic Evolute

## 2022-10-07 NOTE — DISCHARGE NOTE PROVIDER - HOSPITAL COURSE
HPI:  77 yo M with PMhx HTN, HLD , polycythemia vera, moderate AS, CABG x6, LBBB, Afib (Xarelto), PPM (11/2021), Covid (Asymptomatic 4/2022). Pt reports JOEL and intermittent c/p on exertion for several weeks, with minor peripheral edema. Pt denies headaches at this time. Pt evaluated by Dr. Martinez/Jamal for a scheduled TAVR on 10/6/22. Pt denies recent travel, sick contact, or recent covid infection.    Covid swab done on 10/4 at [facility]    1/6 s/p TAVR procedure. B/L femoral groin sites without swelling, bleeding.   HPI:  75 yo M with PMhx HTN, HLD , polycythemia vera, moderate AS, CABG x6, LBBB, Afib (Xarelto), PPM (11/2021), Covid (Asymptomatic 4/2022). Pt reports JOEL and intermittent c/p on exertion for several weeks, with minor peripheral edema. Pt denies headaches at this time. Pt evaluated by Dr. Martinez/Jamal for a scheduled TAVR on 10/6/22. Pt denies recent travel, sick contact, or recent covid infection.    Covid swab done on 10/4 at [facility]    1/6 s/p Medtronic Evolute TAVR procedure. Left femoral Perclose and right femoral Manta sites without swelling, bleeding.

## 2022-10-08 ENCOUNTER — TRANSCRIPTION ENCOUNTER (OUTPATIENT)
Age: 77
End: 2022-10-08

## 2022-10-10 ENCOUNTER — NON-APPOINTMENT (OUTPATIENT)
Age: 77
End: 2022-10-10

## 2022-10-10 ENCOUNTER — APPOINTMENT (OUTPATIENT)
Dept: CARE COORDINATION | Facility: HOME HEALTH | Age: 77
End: 2022-10-10

## 2022-10-10 VITALS — WEIGHT: 225 LBS | HEIGHT: 71 IN | BODY MASS INDEX: 31.5 KG/M2

## 2022-10-10 DIAGNOSIS — I35.0 NONRHEUMATIC AORTIC (VALVE) STENOSIS: ICD-10-CM

## 2022-10-10 PROCEDURE — 99024 POSTOP FOLLOW-UP VISIT: CPT

## 2022-10-10 RX ORDER — NITROGLYCERIN 0.4 MG/1
0.4 TABLET SUBLINGUAL
Qty: 100 | Refills: 1 | Status: DISCONTINUED | COMMUNITY
Start: 2022-07-12 | End: 2022-10-10

## 2022-10-11 ENCOUNTER — APPOINTMENT (OUTPATIENT)
Dept: CARDIOTHORACIC SURGERY | Facility: CLINIC | Age: 77
End: 2022-10-11

## 2022-10-11 ENCOUNTER — LABORATORY RESULT (OUTPATIENT)
Age: 77
End: 2022-10-11

## 2022-10-11 VITALS
WEIGHT: 226 LBS | RESPIRATION RATE: 16 BRPM | OXYGEN SATURATION: 95 % | TEMPERATURE: 98 F | DIASTOLIC BLOOD PRESSURE: 83 MMHG | SYSTOLIC BLOOD PRESSURE: 131 MMHG | HEIGHT: 71 IN | HEART RATE: 67 BPM | BODY MASS INDEX: 31.64 KG/M2

## 2022-10-11 PROBLEM — I35.0 AORTIC STENOSIS: Status: ACTIVE | Noted: 2021-01-12

## 2022-10-11 LAB
S-100B PROTEIN, SERUM RESULT: 57 NG/L — SIGNIFICANT CHANGE UP (ref 0–96)
S-100B PROTEIN, SERUM RESULT: 64 NG/L — SIGNIFICANT CHANGE UP (ref 0–96)

## 2022-10-11 PROCEDURE — 99213 OFFICE O/P EST LOW 20 MIN: CPT

## 2022-10-11 NOTE — REVIEW OF SYSTEMS
[Feeling Tired] : feeling tired [Eye Pain] : no eye pain [Earache] : no earache [Chest Pain] : no chest pain [Palpitations] : no palpitations [Lower Ext Edema] : lower extremity edema [Shortness Of Breath] : shortness of breath [Constipation] : no constipation [Dysuria] : no dysuria [Joint Swelling] : no joint swelling [Skin Lesions] : no skin lesions [Dizziness] : no dizziness [Anxiety] : no anxiety [Proptosis] : no proptosis [Easy Bleeding] : no tendency for easy bleeding [FreeTextEntry7] : abdominal "bloating"

## 2022-10-11 NOTE — ASSESSMENT
[FreeTextEntry1] : Jeremie is a 75 yo M with PMhx HTN, HLD , polycythemia vera, moderate AS, CABG x6, LBBB, Afib (Xarelto), PPM (11/2021), Covid (Asymptomatic 4/2022). Pt reports JOEL and intermittent c/p on exertion for several weeks, with minor peripheral edema. Pt denies headaches at this time. Pt evaluated by Dr. Martinez/Jamal for a \par scheduled TAVR on 10/6/22. Pt denies recent travel, sick contact, or recent covid infection. \par \par On 1/6/2022 patient underwent a Medtronic Evolute TAVR procedure. Left femoral Perclose and right femoral Manta sites without swelling, bleeding. He was discharged to home and agrees to telehealth visit today. Medication reconciliation performed all medications in the home. Patient reports 1 episode of SOB, pedal edema and abdominal bloating unrelated to his known history of constipation. \par \par He is also concerns that his lasix dose was decreased at his discharge. On virtual exam, his breathing is non labored. history of PPM, MCOT was not placed. 2+ pedal edema bilaterally. CTS office called and offered patient to be seen for office visit. Patient refused and would like to keep his post op appointment tomorrow. \par \par Care Navigator contact information provided & yellow card reinforced.  \par Patient was encouraged to call Care Navigator with any issues, concerns, or questions. \par \par 1. Daily Shower\par 2. Weight yourself daily and notify any weight gain greater than 2-3 pounds in 24 hours.\par 3. Regular diet - low fat, low cholesterol, no added salt.\par DO NOT APPLY ANY LOTION, CREAMS, OR POWDERS TO INCISIONS, KEEP OPEN TO AIR\par 4. No driving until cleared by MD. \par 6. No heavy lifting nothing greater than 5 pounds until cleared by MD. \par 7. Call / Notify MD any fever greater than 101.0\par 8. Increase Activity as tolerated. \par 9. Encouraged to arrange a follow up appointment with cardiology & PCP\par \par

## 2022-10-11 NOTE — REASON FOR VISIT
[Follow-Up: _____] : a [unfilled] follow-up visit [Home] : at home, [unfilled] , at the time of the visit. [Other Location: e.g. Home (Enter Location, City,State)___] : at [unfilled] [Patient] : the patient [Self] : self

## 2022-10-12 LAB
ANION GAP SERPL CALC-SCNC: 20 MMOL/L
BUN SERPL-MCNC: 17 MG/DL
CALCIUM SERPL-MCNC: 10.1 MG/DL
CHLORIDE SERPL-SCNC: 104 MMOL/L
CO2 SERPL-SCNC: 20 MMOL/L
CREAT SERPL-MCNC: 1.09 MG/DL
EGFR: 71 ML/MIN/1.73M2
GLUCOSE SERPL-MCNC: 98 MG/DL
POTASSIUM SERPL-SCNC: 4.3 MMOL/L
SODIUM SERPL-SCNC: 144 MMOL/L

## 2022-10-17 ENCOUNTER — TRANSCRIPTION ENCOUNTER (OUTPATIENT)
Age: 77
End: 2022-10-17

## 2022-10-18 ENCOUNTER — RX RENEWAL (OUTPATIENT)
Age: 77
End: 2022-10-18

## 2022-10-19 ENCOUNTER — TRANSCRIPTION ENCOUNTER (OUTPATIENT)
Age: 77
End: 2022-10-19

## 2022-10-21 ENCOUNTER — APPOINTMENT (OUTPATIENT)
Dept: CARDIOLOGY | Facility: CLINIC | Age: 77
End: 2022-10-21

## 2022-10-21 ENCOUNTER — NON-APPOINTMENT (OUTPATIENT)
Age: 77
End: 2022-10-21

## 2022-10-21 VITALS
BODY MASS INDEX: 31.92 KG/M2 | SYSTOLIC BLOOD PRESSURE: 129 MMHG | OXYGEN SATURATION: 97 % | WEIGHT: 228 LBS | HEART RATE: 69 BPM | RESPIRATION RATE: 17 BRPM | DIASTOLIC BLOOD PRESSURE: 75 MMHG | HEIGHT: 71 IN

## 2022-10-21 PROCEDURE — 93000 ELECTROCARDIOGRAM COMPLETE: CPT

## 2022-10-21 PROCEDURE — 99214 OFFICE O/P EST MOD 30 MIN: CPT | Mod: 25

## 2022-10-21 NOTE — REASON FOR VISIT
[FreeTextEntry1] : Jeremie Tommy 76 years old here for follow-up of his cardiac status.  He is now approximately 2 weeks status post transaortic valve replacement for severe aortic stenosis

## 2022-10-21 NOTE — ASSESSMENT
[FreeTextEntry1] : Jeremie he has a long history of chronic ischemic heart disease status post bypass surgery, exertional shortness of breath with mild degrees of exertion even after the bypass which has not changed for many years.  He has some element of diastolic dysfunction inferior wall hypokinesia and mild to moderate aortic stenosis on his last echocardiogram  and status post pacemaker for new left bundle branch block and fatigue.  He had improved with less shortness of breath but recently had COVID-19 and urinary tract infection and colonoscopy with polypectomy.   presently he has had progressive shortness of breath now with minimal exertion and edema of his lower extremities and extreme fatigue.   he developed progressive aortic stenosis on echo and is now status post transaortic valve replacement with a well-functioning bioprosthetic valve on 2D echo.\par \par \par 1.  Chronic ischemic heart disease status post bypass surgery exertional shortness of breath and exertional chest pressure somewhat increased from last visit\par 2.  Chronic edema of the lower extremities probably related to some degree of diastolic dysfunction on Lasix\par 3.  Polycythemia vera new diagnosis on hydroxyurea followed by Dr. Baum\par 4.  Hyperlipidemia LDL way below 70 on small dose of statin\par 5.    Status post transaortic valve valve functioning normal\par 6.    Status post pacemaker\par 7.    still short of breath and still has edema despite the transaortic valve replacement\par

## 2022-10-21 NOTE — DISCUSSION/SUMMARY
[FreeTextEntry1] :  overall the patient is doing well and he needs to be patient with improvement after the transaortic valve replacement which was successful and I think will be helpful in his cardiac status\par .\par   He will remain on his present regimen of medications.  I suggested that he participate in a cardiac rehabilitation program and have put an order in for him to begin. [EKG obtained to assist in diagnosis and management of assessed problem(s)] : EKG obtained to assist in diagnosis and management of assessed problem(s)

## 2022-10-21 NOTE — HISTORY OF PRESENT ILLNESS
[FreeTextEntry1] : Jeremie Sanders  is 76 years old status post coronary bypass surgery, status post pacemaker for left bundle branch block, progressive aortic stenosis with progressive symptoms of shortness of breath and edema. he had some intermittent episodes of paroxysmal atrial fibrillation and is on Xarelto.  He has remained in normal sinus rhythm\par \par   He is status post transaortic valve replacement in early October 2022 at [facility] with an excellent result and no evidence of any aortic insufficiency.  2D echo prior to his discharge in October 2022 revealed a well-functioning bioprosthetic transaortic valve replacement with no aortic insufficiency and mild degree of segmental LV dysfunction.\par \par   Since being home, he still has edema of his lower extremities and he increased his Lasix from 20-40.  He still feels short of breath and has occasional chest pain.  He has had the symptoms of shortness of breath intermittently for many years and the hope was that repair of the aortic stenosis would improve his symptoms which I think with time will.  He denies orthopnea dizziness syncope.\par \par   EKG October 21, 2022 normal sinus rhythm with 100% paced with a left bundle branch block configuration no change

## 2022-10-21 NOTE — PHYSICAL EXAM
[Normal Conjunctiva] : the conjunctiva exhibited no abnormalities [Respiration, Rhythm And Depth] : normal respiratory rhythm and effort [Auscultation Breath Sounds / Voice Sounds] : lungs were clear to auscultation bilaterally [Heart Sounds] : normal S1 and S2 [FreeTextEntry1] :  1+ pitting edema bilateral

## 2022-11-01 ENCOUNTER — APPOINTMENT (OUTPATIENT)
Dept: CARDIOLOGY | Facility: CLINIC | Age: 77
End: 2022-11-01

## 2022-11-01 NOTE — PLAN
[FreeTextEntry1] : Cardiac Rehabilitation Phase 2\par Focus PE at session #1\par ITP initiated -will confer with Dr. Freeman\par All questions answered.\par Welcome packet mailed to patient- to include contact information for registered dietitian.\par Start date: 1/9/23, 430pm- will contact patient if sooner date for this time is made available.\par CR# made available for additional questions or concerns.

## 2022-11-01 NOTE — REVIEW OF SYSTEMS
[Lower Ext Edema] : lower extremity edema [Shortness Of Breath] : shortness of breath [Dyspnea on Exertion] : dyspnea on exertion [Heartburn] : heartburn [Negative] : Musculoskeletal [Vision Problems] : no vision problems [Hearing Loss] : no hearing loss [Chest Pain] : no chest pain [Palpitations] : no palpitations [Claudication] : no  leg claudication [FreeTextEntry7] : GERD symptoms present after eating specific foods; pt follows with GI physician, Dr. Hodge [FreeTextEntry8] : takes medication for BPH [de-identified] : h/o vasovagal- no recent fainting [de-identified] : has appointments with psychologist for emotional health

## 2022-11-01 NOTE — REASON FOR VISIT
[Home] : at home, [unfilled] , at the time of the visit. [Other Location: e.g. Home (Enter Location, City,State)___] : at [unfilled] [Verbal consent obtained from patient] : the patient, [unfilled] [Intake Visit] : an intake visit [Assessment] : an assessment [FreeTextEntry1] : ITP to be reviewed and manually signed by Dr. Estes; ITP to be finalized at session #1; Clinical indication for cardiac rehabilitation: s/p TAVR

## 2022-11-01 NOTE — HISTORY OF PRESENT ILLNESS
[Height: ___] : Height: [unfilled] [Monitoring of weight at home and at cardiac rehabilitation] : Monitoring of weight at home and at cardiac rehabilitation [Individualized exercise program as developed at cardiac rehabilitation] : Individualized exercise program as developed at cardiac rehabilitation [Patient will verbalize benefits of healthy weight management] : Patient will verbalize benefits of healthy weight management [Calories and healthy weight management] : Calories and healthy weight management [Yes, continue with Weight Management plan] : Yes, continue with weight management plan [Preparation/Planning] : Stage of change: preparation/planning [None] : none [Valve replacement/repair] : valve replacement/repair [Age] : age [Sedentary] : sedentary [Overweight/Obesity] : overweight/obesity [Cardiac Rehabilitation] : Cardiac Rehabilitation [___ Days per week] : [unfilled] days per week [>= 31 minutes per session] : >= 31 minutes per session [Target RPE: ___] : Target RPE: [unfilled] [Treadmill] : treadmill [Upright exercise bike] : upright exercise bike [Recumbent bike] : recumbent bike [BioStep] : BioStep [Elliptical] : elliptical [Upper body ergometer] : upper body ergometer [Stair Climber] : stair climber [Walking] : walking [Assess in ___ weeks] : assess in [unfilled] weeks [Perform aerobic activity for ___ consecutive minutes] : perform aerobic activity for [unfilled] consecutive minutes [To start resistance training] : to start resistance training [Individualized Exercise Rx] : individualized exercise Rx [Teach back utilized] : teach back utilized [Welcome packet provided] : welcome packet provided [Yes, per exercise prescription, policy] : Yes, per exercise prescription, policy [Has the patient given CR team permission to speak with the emergency  about the patient?] : Has the patient given CR team permission to speak with the emergency  about the patient? Yes [Does patient have advance directive?] : Does patient have advance directive? Yes [Does patient see mental health provider?] : Does patient see mental health provider? Yes [Psychologist] : psychologist [Self-reports of improved psychosocial well-being] : Self-reports of improved psychosocial well-being [Discuss overview of emotional health supportive modalities] : Discuss overview of emotional health supportive modalities [Mindfulness] : mindfulness [Relaxation breathing techniques] : relaxation breathing techniques [Yes, continue with psychosocial plan] : Yes, continue with psychosocial plan [Action] : Stage of change: Action [Obesity] : Obesity [Reflux] : reflux [Sugar] : sugar [Sodium] : sodium [Trans fats/saturated fats] : trans fats/saturated fats [Is patient on medication for hyperlipidemia?] : Is patient on medication for hyperlipidemia? Yes [Simvastatin] : simvastatin [Family] : family [Patient is interested in seeing a registered dietitian?] : Patient is interested in seeing a registered dietitian? Yes [Self-reports of improved health eating patterns] : Self-reports of improved health eating patterns [Yes, continue with nutrition plan] : Yes, continue with nutrition plan [In progress] : in progress [FreeTextEntry3] : Patient will consider weight loss as a goal. [Angina with exercise] : no angina with exercise [Fall Risk] : no fall risk [] : no [FreeTextEntry1] : Dr. Tellez [de-identified] : Patient states his shortness of breath presents with 2-3 blocks walking. He does want to increase his stamina and endurance.  [FreeTextEntry9] : 11/1/22: Patient states he lives with his daughter, son, and daugter-in-law. [Patient has seen registered dietitian in the past?] : Patient has seen registered dietitian in the past? No [FreeTextEntry6] : Patient states he goes out to dinner with his girlfriend every weekend; chocolate, fried foods: GERD and Cardiac issues that are supported by healthy food choices. [FreeTextEntry8] : Pending insurance coverage, patient may be interested in seeing registered dietitian- contact card mailed with welcome packet.  [FreeTextEntry7] : Improved RYP score

## 2022-11-01 NOTE — DATA REVIEWED
[FreeTextEntry1] : Patient's 10/7/22 post TAVR TTE demonstrated s/p TAVR, minimal paravalvular aortic regurgitation, no aortic transvalvular regurgitation,mild mitral annular calcification, with normal mitral valve; EF of 52% with mild segmental LV systolic dysfunction, moderate diastolic dysjunction, right ventricular enlargement with normal right ventricular systolic function.

## 2022-11-01 NOTE — ASSESSMENT
[FreeTextEntry1] : Patient is a 77 year old male, s/p TAVR on 10/6/22 who was referred for cardiac rehab by Dr. Tellez. His current focal complaints include bilateral foot swelling and 2-3 block dyspnea on exertion. He would like to be able to gain strength and stamina. \par Patient stable to enroll in cardiac rehab.

## 2022-11-01 NOTE — HISTORY OF PRESENT ILLNESS
[Height: ___] : Height: [unfilled] [Monitoring of weight at home and at cardiac rehabilitation] : Monitoring of weight at home and at cardiac rehabilitation [Individualized exercise program as developed at cardiac rehabilitation] : Individualized exercise program as developed at cardiac rehabilitation [Patient will verbalize benefits of healthy weight management] : Patient will verbalize benefits of healthy weight management [Calories and healthy weight management] : Calories and healthy weight management [Yes, continue with Weight Management plan] : Yes, continue with weight management plan [Preparation/Planning] : Stage of change: preparation/planning [None] : none [Valve replacement/repair] : valve replacement/repair [Age] : age [Sedentary] : sedentary [Overweight/Obesity] : overweight/obesity [Cardiac Rehabilitation] : Cardiac Rehabilitation [___ Days per week] : [unfilled] days per week [>= 31 minutes per session] : >= 31 minutes per session [Target RPE: ___] : Target RPE: [unfilled] [Treadmill] : treadmill [Upright exercise bike] : upright exercise bike [Recumbent bike] : recumbent bike [BioStep] : BioStep [Elliptical] : elliptical [Upper body ergometer] : upper body ergometer [Stair Climber] : stair climber [Walking] : walking [Assess in ___ weeks] : assess in [unfilled] weeks [Perform aerobic activity for ___ consecutive minutes] : perform aerobic activity for [unfilled] consecutive minutes [To start resistance training] : to start resistance training [Individualized Exercise Rx] : individualized exercise Rx [Teach back utilized] : teach back utilized [Welcome packet provided] : welcome packet provided [Yes, per exercise prescription, policy] : Yes, per exercise prescription, policy [Has the patient given CR team permission to speak with the emergency  about the patient?] : Has the patient given CR team permission to speak with the emergency  about the patient? Yes [Does patient have advance directive?] : Does patient have advance directive? Yes [Does patient see mental health provider?] : Does patient see mental health provider? Yes [Psychologist] : psychologist [Self-reports of improved psychosocial well-being] : Self-reports of improved psychosocial well-being [Discuss overview of emotional health supportive modalities] : Discuss overview of emotional health supportive modalities [Mindfulness] : mindfulness [Relaxation breathing techniques] : relaxation breathing techniques [Yes, continue with psychosocial plan] : Yes, continue with psychosocial plan [Action] : Stage of change: Action [Obesity] : Obesity [Reflux] : reflux [Sugar] : sugar [Sodium] : sodium [Trans fats/saturated fats] : trans fats/saturated fats [Is patient on medication for hyperlipidemia?] : Is patient on medication for hyperlipidemia? Yes [Simvastatin] : simvastatin [Family] : family [Patient is interested in seeing a registered dietitian?] : Patient is interested in seeing a registered dietitian? Yes [Self-reports of improved health eating patterns] : Self-reports of improved health eating patterns [Yes, continue with nutrition plan] : Yes, continue with nutrition plan [In progress] : in progress [FreeTextEntry3] : Patient will consider weight loss as a goal. [Angina with exercise] : no angina with exercise [Fall Risk] : no fall risk [] : no [FreeTextEntry1] : Dr. Tellez [de-identified] : Patient states his shortness of breath presents with 2-3 blocks walking. He does want to increase his stamina and endurance.  [FreeTextEntry9] : 11/1/22: Patient states he lives with his daughter, son, and daugter-in-law. [Patient has seen registered dietitian in the past?] : Patient has seen registered dietitian in the past? No [FreeTextEntry6] : Patient states he goes out to dinner with his girlfriend every weekend; chocolate, fried foods: GERD and Cardiac issues that are supported by healthy food choices. [FreeTextEntry8] : Pending insurance coverage, patient may be interested in seeing registered dietitian- contact card mailed with welcome packet.  [FreeTextEntry7] : Improved RYP score

## 2022-11-02 ENCOUNTER — RX RENEWAL (OUTPATIENT)
Age: 77
End: 2022-11-02

## 2022-11-08 ENCOUNTER — TRANSCRIPTION ENCOUNTER (OUTPATIENT)
Age: 77
End: 2022-11-08

## 2022-11-08 ENCOUNTER — APPOINTMENT (OUTPATIENT)
Dept: ELECTROPHYSIOLOGY | Facility: CLINIC | Age: 77
End: 2022-11-08

## 2022-11-08 ENCOUNTER — NON-APPOINTMENT (OUTPATIENT)
Age: 77
End: 2022-11-08

## 2022-11-08 PROCEDURE — 93296 REM INTERROG EVL PM/IDS: CPT

## 2022-11-08 PROCEDURE — C1894: CPT

## 2022-11-08 PROCEDURE — C1769: CPT

## 2022-11-08 PROCEDURE — 80048 BASIC METABOLIC PNL TOTAL CA: CPT

## 2022-11-08 PROCEDURE — 93306 TTE W/DOPPLER COMPLETE: CPT

## 2022-11-08 PROCEDURE — C1889: CPT

## 2022-11-08 PROCEDURE — 76000 FLUOROSCOPY <1 HR PHYS/QHP: CPT

## 2022-11-08 PROCEDURE — 82962 GLUCOSE BLOOD TEST: CPT

## 2022-11-08 PROCEDURE — 85025 COMPLETE CBC W/AUTO DIFF WBC: CPT

## 2022-11-08 PROCEDURE — 36415 COLL VENOUS BLD VENIPUNCTURE: CPT

## 2022-11-08 PROCEDURE — 93005 ELECTROCARDIOGRAM TRACING: CPT

## 2022-11-08 PROCEDURE — 86316 IMMUNOASSAY TUMOR OTHER: CPT

## 2022-11-08 PROCEDURE — C1760: CPT

## 2022-11-08 PROCEDURE — C1887: CPT

## 2022-11-08 PROCEDURE — 93294 REM INTERROG EVL PM/LDLS PM: CPT

## 2022-11-08 PROCEDURE — C1725: CPT

## 2022-11-09 DIAGNOSIS — Z00.00 ENCOUNTER FOR GENERAL ADULT MEDICAL EXAMINATION W/OUT ABNORMAL FINDINGS: ICD-10-CM

## 2022-11-28 ENCOUNTER — RX RENEWAL (OUTPATIENT)
Age: 77
End: 2022-11-28

## 2023-01-02 ENCOUNTER — RX RENEWAL (OUTPATIENT)
Age: 78
End: 2023-01-02

## 2023-01-02 RX ORDER — TAMSULOSIN HYDROCHLORIDE 0.4 MG/1
0.4 CAPSULE ORAL
Qty: 90 | Refills: 0 | Status: ACTIVE | COMMUNITY
Start: 2021-11-11 | End: 1900-01-01

## 2023-01-09 ENCOUNTER — APPOINTMENT (OUTPATIENT)
Dept: CARDIOLOGY | Facility: CLINIC | Age: 78
End: 2023-01-09

## 2023-01-09 ENCOUNTER — RX RENEWAL (OUTPATIENT)
Age: 78
End: 2023-01-09

## 2023-01-11 ENCOUNTER — APPOINTMENT (OUTPATIENT)
Dept: CARDIOLOGY | Facility: CLINIC | Age: 78
End: 2023-01-11

## 2023-01-12 ENCOUNTER — APPOINTMENT (OUTPATIENT)
Dept: CARDIOLOGY | Facility: CLINIC | Age: 78
End: 2023-01-12

## 2023-01-18 ENCOUNTER — APPOINTMENT (OUTPATIENT)
Dept: CARDIOLOGY | Facility: CLINIC | Age: 78
End: 2023-01-18

## 2023-01-19 ENCOUNTER — APPOINTMENT (OUTPATIENT)
Dept: CARDIOLOGY | Facility: CLINIC | Age: 78
End: 2023-01-19

## 2023-01-23 ENCOUNTER — APPOINTMENT (OUTPATIENT)
Dept: CARDIOLOGY | Facility: CLINIC | Age: 78
End: 2023-01-23

## 2023-01-25 ENCOUNTER — APPOINTMENT (OUTPATIENT)
Dept: CARDIOLOGY | Facility: CLINIC | Age: 78
End: 2023-01-25

## 2023-01-26 ENCOUNTER — APPOINTMENT (OUTPATIENT)
Dept: CARDIOLOGY | Facility: CLINIC | Age: 78
End: 2023-01-26

## 2023-01-30 ENCOUNTER — APPOINTMENT (OUTPATIENT)
Dept: CARDIOLOGY | Facility: CLINIC | Age: 78
End: 2023-01-30

## 2023-02-01 ENCOUNTER — APPOINTMENT (OUTPATIENT)
Dept: CARDIOLOGY | Facility: CLINIC | Age: 78
End: 2023-02-01

## 2023-02-02 ENCOUNTER — APPOINTMENT (OUTPATIENT)
Dept: CARDIOLOGY | Facility: CLINIC | Age: 78
End: 2023-02-02

## 2023-02-06 ENCOUNTER — RX RENEWAL (OUTPATIENT)
Age: 78
End: 2023-02-06

## 2023-02-06 ENCOUNTER — APPOINTMENT (OUTPATIENT)
Dept: CARDIOLOGY | Facility: CLINIC | Age: 78
End: 2023-02-06

## 2023-02-07 ENCOUNTER — NON-APPOINTMENT (OUTPATIENT)
Age: 78
End: 2023-02-07

## 2023-02-08 ENCOUNTER — APPOINTMENT (OUTPATIENT)
Dept: CARDIOLOGY | Facility: CLINIC | Age: 78
End: 2023-02-08

## 2023-02-09 ENCOUNTER — APPOINTMENT (OUTPATIENT)
Dept: CARDIOLOGY | Facility: CLINIC | Age: 78
End: 2023-02-09

## 2023-02-10 ENCOUNTER — APPOINTMENT (OUTPATIENT)
Dept: ELECTROPHYSIOLOGY | Facility: CLINIC | Age: 78
End: 2023-02-10
Payer: MEDICARE

## 2023-02-10 VITALS — SYSTOLIC BLOOD PRESSURE: 118 MMHG | OXYGEN SATURATION: 97 % | HEART RATE: 77 BPM | DIASTOLIC BLOOD PRESSURE: 77 MMHG

## 2023-02-10 PROCEDURE — 93000 ELECTROCARDIOGRAM COMPLETE: CPT | Mod: 59

## 2023-02-10 PROCEDURE — 93280 PM DEVICE PROGR EVAL DUAL: CPT

## 2023-02-13 ENCOUNTER — APPOINTMENT (OUTPATIENT)
Dept: CARDIOLOGY | Facility: CLINIC | Age: 78
End: 2023-02-13

## 2023-02-15 ENCOUNTER — APPOINTMENT (OUTPATIENT)
Dept: CARDIOLOGY | Facility: CLINIC | Age: 78
End: 2023-02-15

## 2023-02-16 ENCOUNTER — APPOINTMENT (OUTPATIENT)
Dept: CARDIOLOGY | Facility: CLINIC | Age: 78
End: 2023-02-16

## 2023-02-17 ENCOUNTER — APPOINTMENT (OUTPATIENT)
Dept: CARDIOLOGY | Facility: CLINIC | Age: 78
End: 2023-02-17
Payer: MEDICARE

## 2023-02-17 ENCOUNTER — NON-APPOINTMENT (OUTPATIENT)
Age: 78
End: 2023-02-17

## 2023-02-17 VITALS
WEIGHT: 232 LBS | BODY MASS INDEX: 32.36 KG/M2 | OXYGEN SATURATION: 95 % | HEART RATE: 72 BPM | SYSTOLIC BLOOD PRESSURE: 110 MMHG | DIASTOLIC BLOOD PRESSURE: 69 MMHG

## 2023-02-17 PROCEDURE — 99214 OFFICE O/P EST MOD 30 MIN: CPT | Mod: 25

## 2023-02-17 PROCEDURE — 93000 ELECTROCARDIOGRAM COMPLETE: CPT

## 2023-02-19 NOTE — REASON FOR VISIT
[FreeTextEntry1] : Jeremie Sanders 77 years old here for follow-up of his cardiac status.  He is status post coronary bypass surgery many years ago and recently status post transaortic valve replacement.

## 2023-02-19 NOTE — ASSESSMENT
[FreeTextEntry1] : Jeremie he has a long history of chronic ischemic heart disease status post bypass surgery, exertional shortness of breath with mild degrees of exertion even after the bypass which has not changed for many years.  He has some element of diastolic dysfunction inferior wall hypokinesia and mild to moderate aortic stenosis on his last echocardiogram  and status post pacemaker for new left bundle branch block and fatigue.  He had improved with less shortness of breath but recently had COVID-19 and urinary tract infection and colonoscopy with polypectomy.   presently he has had progressive shortness of breath now with minimal exertion and edema of his lower extremities and extreme fatigue.   he developed progressive aortic stenosis on echo and is now status post transaortic valve replacement with a well-functioning bioprosthetic valve on 2D echo.  He recently has gained weight and his edema has increased but he admits to a poor diet eating out frequently increase salt intake and increase caloric intake\par \par \par 1.  Chronic ischemic heart disease status post bypass surgery exertional shortness of breath and exertional chest pressure somewhat increased from last visit\par 2.  Chronic edema of the lower extremities probably related to some degree of diastolic dysfunction on Lasix\par 3.  Polycythemia vera new diagnosis on hydroxyurea followed by Dr. Baum\par 4.  Hyperlipidemia LDL way below 70 on small dose of statin\par 5.    Status post transaortic valve valve functioning normal\par 6.    Status post pacemaker\par 7.    still short of breath and still has edema despite the transaortic valve replacement though he is improved.  He has gained weight and is not careful with salt but has been eating out quite a bit which I think contributes to his lack of increased improvement\par

## 2023-02-19 NOTE — HISTORY OF PRESENT ILLNESS
[FreeTextEntry1] : Jeremie Sanders  is 76 years old status post coronary bypass surgery, status post pacemaker for left bundle branch block, progressive aortic stenosis with progressive symptoms of shortness of breath and edema. he had some intermittent episodes of paroxysmal atrial fibrillation and is on Xarelto.  He has remained in normal sinus rhythm\par \par   He is status post transaortic valve replacement in early October 2022 at [facility] with an excellent result and no evidence of any aortic insufficiency.  2D echo prior to his discharge in October 2022 revealed a well-functioning bioprosthetic transaortic valve replacement with no aortic insufficiency and mild degree of segmental LV dysfunction.\par \par   Since being home, he still has edema of his lower extremities and he increased his Lasix from 20-40.  He still feels short of breath and has occasional chest pain.  He has had the symptoms of shortness of breath intermittently for many years and the hope was that repair of the aortic stenosis would improve his symptoms which I think with time will.  He denies orthopnea dizziness syncope.\par \par   EKG October 21, 2022 normal sinus rhythm with 100% paced with a left bundle branch block configuration no change\par \par   Visit February 17, 2023: Since the transaortic valve replacement, the patient claims that his shortness of breath has improved.  However he is still short of breath with mild to moderate degrees of exertion and he still has edema of his lower extremities.  He was in cardiac rehab but the hours were not suitable for him and he stopped.  He does eat out very frequently and has gained weight.  And feels he is more edematous\par \par He denies chest pain palpitations dizziness or syncope

## 2023-02-19 NOTE — DISCUSSION/SUMMARY
[FreeTextEntry1] : 1.  I told him to increase his Lasix to 40 mg a day\par 2.  We can consider adding Farxiga or Jardiance\par 3.  I had a long discussion with him about the importance of not eating out as much decreasing his salt intake and caloric intake.  This will help his shortness of breath significantly.\par \par I think he has made improvement with the transaortic valve replacement but will improve even further with a better diet low salt and increase of diuretic\par \par Follow-up in 2 months [EKG obtained to assist in diagnosis and management of assessed problem(s)] : EKG obtained to assist in diagnosis and management of assessed problem(s)

## 2023-02-22 ENCOUNTER — APPOINTMENT (OUTPATIENT)
Dept: CARDIOLOGY | Facility: CLINIC | Age: 78
End: 2023-02-22

## 2023-02-23 ENCOUNTER — APPOINTMENT (OUTPATIENT)
Dept: CARDIOLOGY | Facility: CLINIC | Age: 78
End: 2023-02-23

## 2023-02-26 ENCOUNTER — NON-APPOINTMENT (OUTPATIENT)
Age: 78
End: 2023-02-26

## 2023-02-27 ENCOUNTER — APPOINTMENT (OUTPATIENT)
Dept: CARDIOLOGY | Facility: CLINIC | Age: 78
End: 2023-02-27

## 2023-03-01 ENCOUNTER — APPOINTMENT (OUTPATIENT)
Dept: CARDIOLOGY | Facility: CLINIC | Age: 78
End: 2023-03-01

## 2023-03-02 ENCOUNTER — APPOINTMENT (OUTPATIENT)
Dept: CARDIOLOGY | Facility: CLINIC | Age: 78
End: 2023-03-02

## 2023-03-06 ENCOUNTER — APPOINTMENT (OUTPATIENT)
Dept: CARDIOLOGY | Facility: CLINIC | Age: 78
End: 2023-03-06

## 2023-03-08 ENCOUNTER — APPOINTMENT (OUTPATIENT)
Dept: CARDIOLOGY | Facility: CLINIC | Age: 78
End: 2023-03-08

## 2023-03-09 ENCOUNTER — APPOINTMENT (OUTPATIENT)
Dept: CARDIOLOGY | Facility: CLINIC | Age: 78
End: 2023-03-09

## 2023-03-13 ENCOUNTER — APPOINTMENT (OUTPATIENT)
Dept: CARDIOLOGY | Facility: CLINIC | Age: 78
End: 2023-03-13

## 2023-03-13 ENCOUNTER — RX RENEWAL (OUTPATIENT)
Age: 78
End: 2023-03-13

## 2023-03-15 ENCOUNTER — APPOINTMENT (OUTPATIENT)
Dept: CARDIOLOGY | Facility: CLINIC | Age: 78
End: 2023-03-15

## 2023-03-16 ENCOUNTER — APPOINTMENT (OUTPATIENT)
Dept: CARDIOLOGY | Facility: CLINIC | Age: 78
End: 2023-03-16

## 2023-03-20 ENCOUNTER — APPOINTMENT (OUTPATIENT)
Dept: CARDIOLOGY | Facility: CLINIC | Age: 78
End: 2023-03-20

## 2023-03-22 ENCOUNTER — APPOINTMENT (OUTPATIENT)
Dept: CARDIOLOGY | Facility: CLINIC | Age: 78
End: 2023-03-22

## 2023-03-23 ENCOUNTER — APPOINTMENT (OUTPATIENT)
Dept: CARDIOLOGY | Facility: CLINIC | Age: 78
End: 2023-03-23

## 2023-03-27 ENCOUNTER — APPOINTMENT (OUTPATIENT)
Dept: CARDIOLOGY | Facility: CLINIC | Age: 78
End: 2023-03-27

## 2023-03-29 ENCOUNTER — APPOINTMENT (OUTPATIENT)
Dept: CARDIOLOGY | Facility: CLINIC | Age: 78
End: 2023-03-29

## 2023-03-30 ENCOUNTER — APPOINTMENT (OUTPATIENT)
Dept: CARDIOLOGY | Facility: CLINIC | Age: 78
End: 2023-03-30

## 2023-04-03 ENCOUNTER — APPOINTMENT (OUTPATIENT)
Dept: CARDIOLOGY | Facility: CLINIC | Age: 78
End: 2023-04-03

## 2023-04-05 ENCOUNTER — APPOINTMENT (OUTPATIENT)
Dept: CARDIOLOGY | Facility: CLINIC | Age: 78
End: 2023-04-05

## 2023-05-12 ENCOUNTER — NON-APPOINTMENT (OUTPATIENT)
Age: 78
End: 2023-05-12

## 2023-05-12 ENCOUNTER — APPOINTMENT (OUTPATIENT)
Dept: ELECTROPHYSIOLOGY | Facility: CLINIC | Age: 78
End: 2023-05-12
Payer: MEDICARE

## 2023-05-12 PROCEDURE — 93296 REM INTERROG EVL PM/IDS: CPT

## 2023-05-12 PROCEDURE — 93294 REM INTERROG EVL PM/LDLS PM: CPT

## 2023-06-04 ENCOUNTER — RX RENEWAL (OUTPATIENT)
Age: 78
End: 2023-06-04

## 2023-06-14 ENCOUNTER — APPOINTMENT (OUTPATIENT)
Dept: CARDIOLOGY | Facility: CLINIC | Age: 78
End: 2023-06-14
Payer: MEDICARE

## 2023-06-14 ENCOUNTER — NON-APPOINTMENT (OUTPATIENT)
Age: 78
End: 2023-06-14

## 2023-06-14 VITALS
BODY MASS INDEX: 32.08 KG/M2 | WEIGHT: 230 LBS | DIASTOLIC BLOOD PRESSURE: 70 MMHG | HEART RATE: 65 BPM | SYSTOLIC BLOOD PRESSURE: 125 MMHG | OXYGEN SATURATION: 96 %

## 2023-06-14 DIAGNOSIS — I48.91 UNSPECIFIED ATRIAL FIBRILLATION: ICD-10-CM

## 2023-06-14 DIAGNOSIS — I47.1 SUPRAVENTRICULAR TACHYCARDIA: ICD-10-CM

## 2023-06-14 PROCEDURE — 99214 OFFICE O/P EST MOD 30 MIN: CPT | Mod: 25

## 2023-06-14 PROCEDURE — 93000 ELECTROCARDIOGRAM COMPLETE: CPT

## 2023-06-14 NOTE — REASON FOR VISIT
[FreeTextEntry1] : Jeremie Sanders 77 years old here for follow-up of his cardiac status.  Apparently he was called by the pacemaker clinic that he had a bout of atrial fibrillation.  He was seen on June 14, 2023.

## 2023-08-10 ENCOUNTER — RX RENEWAL (OUTPATIENT)
Age: 78
End: 2023-08-10

## 2023-08-10 ENCOUNTER — APPOINTMENT (OUTPATIENT)
Dept: ELECTROPHYSIOLOGY | Facility: CLINIC | Age: 78
End: 2023-08-10

## 2023-08-10 RX ORDER — FAMOTIDINE 40 MG/1
40 TABLET, FILM COATED ORAL DAILY
Qty: 100 | Refills: 2 | Status: ACTIVE | COMMUNITY
Start: 2021-12-05 | End: 1900-01-01

## 2023-08-11 ENCOUNTER — NON-APPOINTMENT (OUTPATIENT)
Age: 78
End: 2023-08-11

## 2023-08-11 ENCOUNTER — APPOINTMENT (OUTPATIENT)
Dept: ELECTROPHYSIOLOGY | Facility: CLINIC | Age: 78
End: 2023-08-11
Payer: MEDICARE

## 2023-08-11 PROCEDURE — 93296 REM INTERROG EVL PM/IDS: CPT

## 2023-08-11 PROCEDURE — 93294 REM INTERROG EVL PM/LDLS PM: CPT

## 2023-08-21 ENCOUNTER — RX RENEWAL (OUTPATIENT)
Age: 78
End: 2023-08-21

## 2023-08-21 RX ORDER — FINASTERIDE 5 MG/1
5 TABLET, FILM COATED ORAL DAILY
Qty: 100 | Refills: 2 | Status: ACTIVE | COMMUNITY
Start: 2021-01-07 | End: 1900-01-01

## 2023-10-27 ENCOUNTER — NON-APPOINTMENT (OUTPATIENT)
Age: 78
End: 2023-10-27

## 2023-10-27 ENCOUNTER — APPOINTMENT (OUTPATIENT)
Dept: CARDIOLOGY | Facility: CLINIC | Age: 78
End: 2023-10-27
Payer: MEDICARE

## 2023-10-27 VITALS
SYSTOLIC BLOOD PRESSURE: 126 MMHG | HEART RATE: 60 BPM | WEIGHT: 224 LBS | OXYGEN SATURATION: 95 % | DIASTOLIC BLOOD PRESSURE: 76 MMHG | BODY MASS INDEX: 31.24 KG/M2

## 2023-10-27 DIAGNOSIS — I48.0 PAROXYSMAL ATRIAL FIBRILLATION: ICD-10-CM

## 2023-10-27 DIAGNOSIS — R55 SYNCOPE AND COLLAPSE: ICD-10-CM

## 2023-10-27 PROCEDURE — 99214 OFFICE O/P EST MOD 30 MIN: CPT | Mod: 25

## 2023-10-27 PROCEDURE — 93000 ELECTROCARDIOGRAM COMPLETE: CPT

## 2023-11-10 ENCOUNTER — APPOINTMENT (OUTPATIENT)
Dept: ELECTROPHYSIOLOGY | Facility: CLINIC | Age: 78
End: 2023-11-10
Payer: MEDICARE

## 2023-11-10 ENCOUNTER — NON-APPOINTMENT (OUTPATIENT)
Age: 78
End: 2023-11-10

## 2023-11-11 PROCEDURE — 93294 REM INTERROG EVL PM/LDLS PM: CPT

## 2023-11-11 PROCEDURE — 93296 REM INTERROG EVL PM/IDS: CPT

## 2023-11-16 ENCOUNTER — RX RENEWAL (OUTPATIENT)
Age: 78
End: 2023-11-16

## 2023-12-01 ENCOUNTER — APPOINTMENT (OUTPATIENT)
Dept: CARDIOLOGY | Facility: CLINIC | Age: 78
End: 2023-12-01
Payer: MEDICARE

## 2023-12-01 PROCEDURE — 93306 TTE W/DOPPLER COMPLETE: CPT

## 2023-12-07 ENCOUNTER — RX RENEWAL (OUTPATIENT)
Age: 78
End: 2023-12-07

## 2024-01-22 NOTE — DISCHARGE NOTE PROVIDER - NSDCCPCAREPLAN_GEN_ALL_CORE_FT
Quality 402: Tobacco Use And Help With Quitting Among Adolescents: Patient screened for tobacco and is an ex-smoker Quality 226: Preventive Care And Screening: Tobacco Use: Screening And Cessation Intervention: Patient screened for tobacco use and is an ex/non-smoker Detail Level: Detailed PRINCIPAL DISCHARGE DIAGNOSIS  Diagnosis: Severe aortic stenosis  Assessment and Plan of Treatment: No heavy lifting, strenuous activity, bending, straining, or unnecessary stair climbing for 2 weeks. No driving for 2 days. You may shower 24 hours following the procedure but avoid baths/swimming for 1 week. Check your groin site for bleeding and/or swelling daily following procedure and call your doctor immediately if it occurs or if you experience increased pain at the site. Follow up with your cardiologist in 1-2 weeks. You may call [facility] if you have any questions/concerns regarding your procedure [phone].      SECONDARY DISCHARGE DIAGNOSES  Diagnosis: Atrial fibrillation  Assessment and Plan of Treatment: Continue with your cardiologist and primary care MD. Continue your current medications. Call your physician for palpitations, feelings of rapid heart beat, lightheadedness, or dizziness. Continue Eliquis as prescribed.

## 2024-01-29 ENCOUNTER — NON-APPOINTMENT (OUTPATIENT)
Age: 79
End: 2024-01-29

## 2024-02-02 ENCOUNTER — APPOINTMENT (OUTPATIENT)
Dept: INTERNAL MEDICINE | Facility: CLINIC | Age: 79
End: 2024-02-02
Payer: MEDICARE

## 2024-02-02 ENCOUNTER — NON-APPOINTMENT (OUTPATIENT)
Age: 79
End: 2024-02-02

## 2024-02-02 VITALS
DIASTOLIC BLOOD PRESSURE: 76 MMHG | BODY MASS INDEX: 31.81 KG/M2 | SYSTOLIC BLOOD PRESSURE: 149 MMHG | OXYGEN SATURATION: 94 % | WEIGHT: 227.25 LBS | HEART RATE: 70 BPM | HEIGHT: 71 IN

## 2024-02-02 DIAGNOSIS — M54.50 LOW BACK PAIN, UNSPECIFIED: ICD-10-CM

## 2024-02-02 DIAGNOSIS — R25.2 CRAMP AND SPASM: ICD-10-CM

## 2024-02-02 PROCEDURE — 99203 OFFICE O/P NEW LOW 30 MIN: CPT

## 2024-02-02 NOTE — PHYSICAL EXAM
[No Acute Distress] : no acute distress [EOMI] : extraocular movements intact [Normal Outer Ear/Nose] : the outer ears and nose were normal in appearance [No Respiratory Distress] : no respiratory distress  [Normal Rate] : normal rate  [de-identified] : +2 LE edema b/l (per pt this is chronic) [de-identified] : +straight leg raise  [de-identified] : good strength and tone in LE, sensation intact

## 2024-02-02 NOTE — HISTORY OF PRESENT ILLNESS
[FreeTextEntry8] : pt presents for LE cramping. Pt also having pain in the low back. all started about a week ago.  was stuck in the house for 2 weeks because of a recent covid infection. then family got it so ended up isolating for longer.  last night was woken up because of the cramping. needs to walk around and had to use a heating pad to get rid of it was seen in UC on Monday and given rx muscle relaxer. was also advised to f/u with cardio to ensure no PAD or PVD Patient states that he was diagnosed with prostate cancer earlier last year.  Per patient it was localized to the prostate.  Patient is status post radiation.  No lower extremity weakness no change sensation otherwise. patient has tried tonic water and is using heating pad.  He is taking over-the-counter acetaminophen for the pain with minimal relief.  No loss of bowel or bladder continence.   h/o prostate ca- seeing Dr Rojas at [facility]. last saw them yesterday. on a new medication for rad onc saw Dr Reyes at [facility]  
Labs

## 2024-02-06 ENCOUNTER — NON-APPOINTMENT (OUTPATIENT)
Age: 79
End: 2024-02-06

## 2024-02-06 ENCOUNTER — APPOINTMENT (OUTPATIENT)
Dept: CARDIOLOGY | Facility: CLINIC | Age: 79
End: 2024-02-06
Payer: MEDICARE

## 2024-02-06 VITALS
SYSTOLIC BLOOD PRESSURE: 128 MMHG | OXYGEN SATURATION: 95 % | HEART RATE: 98 BPM | BODY MASS INDEX: 31.66 KG/M2 | WEIGHT: 227 LBS | DIASTOLIC BLOOD PRESSURE: 87 MMHG

## 2024-02-06 DIAGNOSIS — M79.605 PAIN IN RIGHT LEG: ICD-10-CM

## 2024-02-06 DIAGNOSIS — C61 MALIGNANT NEOPLASM OF PROSTATE: ICD-10-CM

## 2024-02-06 DIAGNOSIS — M51.9 UNSPECIFIED THORACIC, THORACOLUMBAR AND LUMBOSACRAL INTERVERTEBRAL DISC DISORDER: ICD-10-CM

## 2024-02-06 DIAGNOSIS — R53.83 OTHER FATIGUE: ICD-10-CM

## 2024-02-06 DIAGNOSIS — M79.604 PAIN IN RIGHT LEG: ICD-10-CM

## 2024-02-06 PROCEDURE — G2211 COMPLEX E/M VISIT ADD ON: CPT

## 2024-02-06 PROCEDURE — 99214 OFFICE O/P EST MOD 30 MIN: CPT

## 2024-02-06 PROCEDURE — 93000 ELECTROCARDIOGRAM COMPLETE: CPT

## 2024-02-06 RX ORDER — ACETAMINOPHEN 500 MG/1
TABLET ORAL
Refills: 0 | Status: ACTIVE | COMMUNITY

## 2024-02-07 DIAGNOSIS — M53.9 DORSOPATHY, UNSPECIFIED: ICD-10-CM

## 2024-02-07 LAB
ALBUMIN SERPL ELPH-MCNC: 4.7 G/DL
ALP BLD-CCNC: 66 U/L
ALT SERPL-CCNC: 34 U/L
ANION GAP SERPL CALC-SCNC: 13 MMOL/L
AST SERPL-CCNC: 31 U/L
BASOPHILS # BLD AUTO: 0.04 K/UL
BASOPHILS NFR BLD AUTO: 0.4 %
BILIRUB SERPL-MCNC: 1.3 MG/DL
BUN SERPL-MCNC: 22 MG/DL
CALCIUM SERPL-MCNC: 10.4 MG/DL
CHLORIDE SERPL-SCNC: 102 MMOL/L
CO2 SERPL-SCNC: 24 MMOL/L
CREAT SERPL-MCNC: 1.24 MG/DL
EGFR: 60 ML/MIN/1.73M2
EOSINOPHIL # BLD AUTO: 0.02 K/UL
EOSINOPHIL NFR BLD AUTO: 0.2 %
FERRITIN SERPL-MCNC: 212 NG/ML
FOLATE SERPL-MCNC: >20 NG/ML
GLUCOSE SERPL-MCNC: 136 MG/DL
HCT VFR BLD CALC: 41.4 %
HGB BLD-MCNC: 13.5 G/DL
IMM GRANULOCYTES NFR BLD AUTO: 2.1 %
LYMPHOCYTES # BLD AUTO: 0.65 K/UL
LYMPHOCYTES NFR BLD AUTO: 6.8 %
MAGNESIUM SERPL-MCNC: 2 MG/DL
MAN DIFF?: NORMAL
MCHC RBC-ENTMCNC: 30.5 PG
MCHC RBC-ENTMCNC: 32.6 GM/DL
MCV RBC AUTO: 93.5 FL
MONOCYTES # BLD AUTO: 0.38 K/UL
MONOCYTES NFR BLD AUTO: 4 %
NEUTROPHILS # BLD AUTO: 8.2 K/UL
NEUTROPHILS NFR BLD AUTO: 86.5 %
PLATELET # BLD AUTO: 208 K/UL
POTASSIUM SERPL-SCNC: 4.5 MMOL/L
PROT SERPL-MCNC: 7.1 G/DL
RBC # BLD: 4.43 M/UL
RBC # FLD: 14.1 %
SODIUM SERPL-SCNC: 139 MMOL/L
WBC # FLD AUTO: 9.49 K/UL

## 2024-02-12 ENCOUNTER — NON-APPOINTMENT (OUTPATIENT)
Age: 79
End: 2024-02-12

## 2024-02-12 ENCOUNTER — RX RENEWAL (OUTPATIENT)
Age: 79
End: 2024-02-12

## 2024-02-12 ENCOUNTER — APPOINTMENT (OUTPATIENT)
Dept: ELECTROPHYSIOLOGY | Facility: CLINIC | Age: 79
End: 2024-02-12
Payer: MEDICARE

## 2024-02-12 VITALS — OXYGEN SATURATION: 95 % | DIASTOLIC BLOOD PRESSURE: 80 MMHG | SYSTOLIC BLOOD PRESSURE: 146 MMHG | HEART RATE: 72 BPM

## 2024-02-12 PROCEDURE — 93280 PM DEVICE PROGR EVAL DUAL: CPT

## 2024-02-12 RX ORDER — MECLIZINE HYDROCHLORIDE 25 MG/1
25 TABLET ORAL 3 TIMES DAILY
Qty: 270 | Refills: 0 | Status: ACTIVE | COMMUNITY
Start: 2021-01-13 | End: 1900-01-01

## 2024-02-15 ENCOUNTER — RX RENEWAL (OUTPATIENT)
Age: 79
End: 2024-02-15

## 2024-02-15 RX ORDER — ISOSORBIDE MONONITRATE 60 MG/1
60 TABLET, EXTENDED RELEASE ORAL DAILY
Qty: 100 | Refills: 2 | Status: ACTIVE | COMMUNITY
Start: 2021-11-11 | End: 1900-01-01

## 2024-02-15 RX ORDER — FUROSEMIDE 40 MG/1
40 TABLET ORAL DAILY
Qty: 200 | Refills: 2 | Status: ACTIVE | COMMUNITY
Start: 2021-11-11 | End: 1900-01-01

## 2024-02-15 RX ORDER — POTASSIUM CHLORIDE 750 MG/1
10 TABLET, FILM COATED, EXTENDED RELEASE ORAL
Qty: 100 | Refills: 2 | Status: ACTIVE | COMMUNITY
Start: 2021-11-11 | End: 1900-01-01

## 2024-02-15 RX ORDER — ATENOLOL 25 MG/1
25 TABLET ORAL DAILY
Qty: 100 | Refills: 2 | Status: ACTIVE | COMMUNITY
Start: 2022-02-20 | End: 1900-01-01

## 2024-02-15 RX ORDER — FOLIC ACID 1 MG/1
1 TABLET ORAL DAILY
Qty: 100 | Refills: 2 | Status: ACTIVE | COMMUNITY
Start: 2021-09-10 | End: 1900-01-01

## 2024-02-16 ENCOUNTER — APPOINTMENT (OUTPATIENT)
Dept: ORTHOPEDIC SURGERY | Facility: CLINIC | Age: 79
End: 2024-02-16
Payer: MEDICARE

## 2024-02-16 VITALS — HEIGHT: 71 IN | WEIGHT: 227 LBS | BODY MASS INDEX: 31.78 KG/M2

## 2024-02-16 DIAGNOSIS — M51.36 OTHER INTERVERTEBRAL DISC DEGENERATION, LUMBAR REGION: ICD-10-CM

## 2024-02-16 PROCEDURE — 99214 OFFICE O/P EST MOD 30 MIN: CPT

## 2024-02-16 PROCEDURE — 99204 OFFICE O/P NEW MOD 45 MIN: CPT

## 2024-02-16 RX ORDER — DICLOFENAC SODIUM 1% 10 MG/G
1 GEL TOPICAL
Qty: 3 | Refills: 0 | Status: ACTIVE | COMMUNITY
Start: 2024-02-16 | End: 1900-01-01

## 2024-02-16 NOTE — PHYSICAL EXAM
[Normal] : Oriented to person, place, and time, insight and judgement were intact and the affect was normal [] : Motor: [UE Motor Strength NL] : Motor strength of the bilateral upper extremities is normal [Motor Strength Lower Extremities] : left (5/5) [NL] : normal and symmetric bilaterally [UE/LE] : Sensory: Intact in bilateral upper & lower extremities [ALL] : Biceps, brachioradialis, triceps, patellar, ankle and plantar 2+ and symmetric bilaterally [Radford's Sign] : negative Radford's sign [Pronator Drift] : negative pronator drift [SLR] : negative straight leg raise [de-identified] : Lumbar ROM: Limited, painful NTTP L spine and b/l paraspinals lumbar region Skin intact L spine. No rashes, ulcers, blisters.  No lymphedema.  Rapid alternating movements- intact Finger to nose testing- intact Full and non-painful ROM RUE, LUE, RLE and LLE. Skin intact RUE, LUE, RLE and LLE. No rashes, blisters, ulcers. NTTP RUE, LUE, RLE and LLE. No evidence of dislocation or subluxation B/L upper and lower extremities. [de-identified] : Xrayu lumbar spine [facility] 02/05/24: FINDINGS/IMPRESSION:     For the purposes of this dictation, there are 5 lumbar type vertebral bodies. Please correlate with full spine imaging with attention to correct level prior to any surgical intervention or procedure.     No acute compression fracture.  There are  moderate to advanced degenerative changes with intervertebral disc space narrowing and endplate osteophytes L1-L4 and mild intervertebral disc space narrowing at L4-L5. There is moderate facet hypertrophy of the lower lumbar spine.  There is no significant spondylolisthesis on flexion and extension views. There are atherosclerotic vascular calcifications.       CLINICAL INDICATION:  1.lbp   TECHNIQUE:  XR LUMBAR SPINE 4 OR 5 VIEWS   COMPARISON STUDIES: None

## 2024-02-16 NOTE — HISTORY OF PRESENT ILLNESS
[Acetaminophen] : relieved by acetaminophen [Heat] : relieved by heat [de-identified] : Pt presents with c/o buttock pain for the past 2 weeks. Pt denies recent injury/falls. Pt states he gets pain on B/L buttocks, B/L thighs and at times on B/L calves. Pt denies numbness, tingling, or weakness on B/L LE. pt takes  acetaminophen this provides some pain relief. Pt denies having PATRICIO in the past, Pt participate with PT since 02/12/24 Pt denies fever, chills, weight changes, loss of bladder control, bowel incontinence or urinary retention or saddle anesthesia The patient's past medical history, past surgical history, medications, allergies, and social history were reviewed by me today with the patient and documented accordingly. In addition, the patient's family history, which is noncontributory to this visit, was also reviewed. [Ataxia] : no ataxia [Incontinence] : no incontinence [Loss of Dexterity] : good dexterity [Urinary Ret.] : no urinary retention [de-identified] : recumbency exacerbates pain

## 2024-02-16 NOTE — DISCUSSION/SUMMARY
[de-identified] : 77 yo male with degenerative scoliosis, recommend PT, tylenol, voltaren gel, RTO as needed, no surgical intervention.   Diagnosis, prognosis, natural history and treatment was discussed with patient. Patient was advised if the following symptoms develop: chills, fever,  loss of bladder control, bowel incontinence or urinary retention, numbness/tingling or weakness is present in upper or lower extremities, to go to the nearest emergency room. This may be a new clinical condition not present at the time of the patient visit  that may lead to paralysis and/or death, Patient advised if the above symptoms developed to also call the office immediately to inform us and to go to the nearest emergency room.

## 2024-03-29 ENCOUNTER — OUTPATIENT (OUTPATIENT)
Dept: OUTPATIENT SERVICES | Facility: HOSPITAL | Age: 79
LOS: 1 days | End: 2024-03-29

## 2024-03-29 ENCOUNTER — APPOINTMENT (OUTPATIENT)
Dept: WOUND CARE | Facility: HOSPITAL | Age: 79
End: 2024-03-29
Payer: MEDICARE

## 2024-03-29 DIAGNOSIS — Z98.89 OTHER SPECIFIED POSTPROCEDURAL STATES: Chronic | ICD-10-CM

## 2024-03-29 DIAGNOSIS — N17.9 ACUTE KIDNEY FAILURE, UNSPECIFIED: ICD-10-CM

## 2024-03-29 DIAGNOSIS — N30.40 IRRADIATION CYSTITIS W/OUT HEMATURIA: ICD-10-CM

## 2024-03-29 DIAGNOSIS — Z95.1 PRESENCE OF AORTOCORONARY BYPASS GRAFT: Chronic | ICD-10-CM

## 2024-03-29 DIAGNOSIS — Z95.0 PRESENCE OF CARDIAC PACEMAKER: Chronic | ICD-10-CM

## 2024-03-29 DIAGNOSIS — Z90.49 ACQUIRED ABSENCE OF OTHER SPECIFIED PARTS OF DIGESTIVE TRACT: Chronic | ICD-10-CM

## 2024-03-29 PROCEDURE — 99204 OFFICE O/P NEW MOD 45 MIN: CPT

## 2024-03-29 PROCEDURE — G0463: CPT

## 2024-03-29 PROCEDURE — 99214 OFFICE O/P EST MOD 30 MIN: CPT

## 2024-03-29 NOTE — REVIEW OF SYSTEMS
[Heartburn] : heartburn [Dysuria] : dysuria [Easy Bruising] : a tendency for easy bruising [Negative] : Neurological [FreeTextEntry5] : CABG Pacemaker TAVR [FreeTextEntry6] : Equal chest rise [de-identified] : Plavix, Eliquis

## 2024-03-29 NOTE — HISTORY OF PRESENT ILLNESS
[FreeTextEntry1] : Mr. JUNIE CARPENTER   presents to the office with radiation cystitis. The patient has complaint of pain and burning when urinating.   Patient is s/p radiation treatments for prostate cancer. The patient denies fevers or chills. The patient has no other complaints or associated symptoms.

## 2024-03-29 NOTE — REASON FOR VISIT
[Consultation] : a consultation visit [Friend] : friend [FreeTextEntry1] : Hyperbaric oxygen treatments

## 2024-03-29 NOTE — PHYSICAL EXAM
[No Rash or Lesion] : No rash or lesion [Alert] : alert [Oriented to Person] : oriented to person [Oriented to Place] : oriented to place [Anxious] : anxious [Oriented to Time] : oriented to time [de-identified] : nad [de-identified] : non traumatic [de-identified] : equal chest rise [de-identified] : supple [FreeTextEntry1] : Patient was evaluated for hyperbaric treatment and is deemed a candidate for HBO therapy. Patient's H and P was reviewed, and patient was given an orientation of the HBO suite and treatment schedule. Patient was educated on the risks and the benefits of the treatment. Patient read and signed consent prior to the initiation of any screening procedure. Patient had the opportunity to discuss any questions regarding HBO therapy. Writer witnessed the signing of the consent, and the patient was given a copy of the signed consent. Patient had a Chest Xray one week ago and will provide copy to the office to be placed in his chart.

## 2024-04-08 ENCOUNTER — NON-APPOINTMENT (OUTPATIENT)
Age: 79
End: 2024-04-08

## 2024-04-15 ENCOUNTER — APPOINTMENT (OUTPATIENT)
Dept: HYPERBARIC MEDICINE | Facility: CLINIC | Age: 79
End: 2024-04-15

## 2024-04-16 ENCOUNTER — APPOINTMENT (OUTPATIENT)
Dept: HYPERBARIC MEDICINE | Facility: CLINIC | Age: 79
End: 2024-04-16

## 2024-04-17 ENCOUNTER — APPOINTMENT (OUTPATIENT)
Dept: HYPERBARIC MEDICINE | Facility: CLINIC | Age: 79
End: 2024-04-17

## 2024-04-18 ENCOUNTER — APPOINTMENT (OUTPATIENT)
Dept: HYPERBARIC MEDICINE | Facility: CLINIC | Age: 79
End: 2024-04-18

## 2024-04-19 ENCOUNTER — APPOINTMENT (OUTPATIENT)
Dept: HYPERBARIC MEDICINE | Facility: CLINIC | Age: 79
End: 2024-04-19

## 2024-04-22 ENCOUNTER — APPOINTMENT (OUTPATIENT)
Dept: HYPERBARIC MEDICINE | Facility: CLINIC | Age: 79
End: 2024-04-22

## 2024-04-23 ENCOUNTER — APPOINTMENT (OUTPATIENT)
Dept: HYPERBARIC MEDICINE | Facility: CLINIC | Age: 79
End: 2024-04-23

## 2024-04-24 ENCOUNTER — APPOINTMENT (OUTPATIENT)
Dept: HYPERBARIC MEDICINE | Facility: CLINIC | Age: 79
End: 2024-04-24

## 2024-04-25 ENCOUNTER — APPOINTMENT (OUTPATIENT)
Dept: HYPERBARIC MEDICINE | Facility: CLINIC | Age: 79
End: 2024-04-25

## 2024-04-26 ENCOUNTER — APPOINTMENT (OUTPATIENT)
Dept: CARDIOLOGY | Facility: CLINIC | Age: 79
End: 2024-04-26

## 2024-04-26 ENCOUNTER — APPOINTMENT (OUTPATIENT)
Dept: HYPERBARIC MEDICINE | Facility: CLINIC | Age: 79
End: 2024-04-26

## 2024-04-26 DIAGNOSIS — N17.9 ACUTE KIDNEY FAILURE, UNSPECIFIED: ICD-10-CM

## 2024-04-26 DIAGNOSIS — N30.40 IRRADIATION CYSTITIS WITHOUT HEMATURIA: ICD-10-CM

## 2024-04-29 ENCOUNTER — APPOINTMENT (OUTPATIENT)
Dept: HYPERBARIC MEDICINE | Facility: CLINIC | Age: 79
End: 2024-04-29

## 2024-04-30 ENCOUNTER — APPOINTMENT (OUTPATIENT)
Dept: HYPERBARIC MEDICINE | Facility: CLINIC | Age: 79
End: 2024-04-30

## 2024-05-01 ENCOUNTER — APPOINTMENT (OUTPATIENT)
Dept: HYPERBARIC MEDICINE | Facility: CLINIC | Age: 79
End: 2024-05-01

## 2024-05-01 ENCOUNTER — APPOINTMENT (OUTPATIENT)
Dept: CARDIOLOGY | Facility: CLINIC | Age: 79
End: 2024-05-01
Payer: MEDICARE

## 2024-05-01 VITALS
HEIGHT: 71 IN | DIASTOLIC BLOOD PRESSURE: 92 MMHG | OXYGEN SATURATION: 96 % | HEART RATE: 100 BPM | WEIGHT: 228.5 LBS | SYSTOLIC BLOOD PRESSURE: 160 MMHG | BODY MASS INDEX: 31.99 KG/M2

## 2024-05-01 DIAGNOSIS — I25.9 CHRONIC ISCHEMIC HEART DISEASE, UNSPECIFIED: ICD-10-CM

## 2024-05-01 DIAGNOSIS — R60.0 LOCALIZED EDEMA: ICD-10-CM

## 2024-05-01 DIAGNOSIS — Z95.0 PRESENCE OF CARDIAC PACEMAKER: ICD-10-CM

## 2024-05-01 DIAGNOSIS — I44.7 LEFT BUNDLE-BRANCH BLOCK, UNSPECIFIED: ICD-10-CM

## 2024-05-01 DIAGNOSIS — I10 ESSENTIAL (PRIMARY) HYPERTENSION: ICD-10-CM

## 2024-05-01 DIAGNOSIS — Z95.3 PRESENCE OF XENOGENIC HEART VALVE: ICD-10-CM

## 2024-05-01 DIAGNOSIS — Z95.1 PRESENCE OF AORTOCORONARY BYPASS GRAFT: ICD-10-CM

## 2024-05-01 DIAGNOSIS — D45 POLYCYTHEMIA VERA: ICD-10-CM

## 2024-05-01 DIAGNOSIS — R06.02 SHORTNESS OF BREATH: ICD-10-CM

## 2024-05-01 PROCEDURE — 99214 OFFICE O/P EST MOD 30 MIN: CPT

## 2024-05-01 PROCEDURE — G2211 COMPLEX E/M VISIT ADD ON: CPT

## 2024-05-01 RX ORDER — LOSARTAN POTASSIUM 25 MG/1
25 TABLET, FILM COATED ORAL
Qty: 3 | Refills: 2 | Status: ACTIVE | COMMUNITY
Start: 2024-05-01 | End: 1900-01-01

## 2024-05-01 RX ORDER — LOSARTAN POTASSIUM 25 MG/1
25 TABLET, FILM COATED ORAL
Qty: 90 | Refills: 0 | Status: ACTIVE | COMMUNITY
Start: 2024-05-01 | End: 1900-01-01

## 2024-05-01 RX ORDER — ALPRAZOLAM 0.25 MG/1
0.25 TABLET ORAL
Refills: 0 | Status: ACTIVE | COMMUNITY

## 2024-05-02 ENCOUNTER — APPOINTMENT (OUTPATIENT)
Dept: HYPERBARIC MEDICINE | Facility: CLINIC | Age: 79
End: 2024-05-02

## 2024-05-02 NOTE — ASSESSMENT
[FreeTextEntry1] : Patient overall is clinically stable with chronic shortness of breath which definitely has improved since the transaortic valve replacement.  He is status post therapy for prostate carcinoma which is now completed CyberKnife.  He does have a murmur diastolic which I heard for the first time at the left base suggestive of some degree of aortic insufficiency Presently he has completed CyberKnife but has had a variety of urinary symptoms not feeling well and episodic hypertension.  He is undergoing hyperbaric therapy and his blood pressure can elevate significantly prior to the hyperbaric therapy there is a great deal of anxiety.  1. Chronic ischemic heart disease status post bypass surgery exertional shortness of breath and exertional chest pressure somewhat increased from last visit  2. Chronic edema of the lower extremities probably related to some degree of diastolic dysfunction on Lasix  3. Polycythemia vera new diagnosis on hydroxyurea followed by Dr. Baum  4. Hyperlipidemia LDL way below 70 on small dose of statin  5. Status post transaortic valve valve functioning normal-patient has not had an echo since October 2022.  He does have an diastolic murmur audible today at the left base suggestive of some degree of aortic insufficiency repeat echo is needed  6. Status post pacemaker  7. still short of breath and still has edema despite the transaortic valve replacement though he is improved. He is hemodynamically stable   8.  Prostate carcinoma status post CyberKnife.  Now with episodic hypertension on hyperbaric oxygen therapy.  He is a great deal of anxiety.  Today's blood pressure is well-controlled though on the higher side of where he usually is.  I think a good deal of the episodic hypertension is his anxiety over his illness and not feeling the way he wants to feel  Cardiac wise he is stable with stable shortness of breath and edema and no angina and a well-functioning transaortic valve replacement .

## 2024-05-02 NOTE — REASON FOR VISIT
[FreeTextEntry1] : Jeremie Tommy well-known to me 78 years old with known prostate cancer status status post CyberKnife now with episodic hypertension receiving hyperbaric therapy for recurrent severe burning when he urinates.

## 2024-05-02 NOTE — DISCUSSION/SUMMARY
[FreeTextEntry1] : 1.  I tried to reassure him that a good deal of his hypertension was anxiety related 2.  I did prescribe losartan 25 mg which she should take if his blood pressure goes above 150. 3.  He will continue with the hyperbaric therapy 4.  I think Xanax will be helpful to keep his pressure under control and his anxiety level improved  Follow-up again in 1 month

## 2024-05-03 ENCOUNTER — APPOINTMENT (OUTPATIENT)
Dept: INTERNAL MEDICINE | Facility: CLINIC | Age: 79
End: 2024-05-03

## 2024-05-03 ENCOUNTER — APPOINTMENT (OUTPATIENT)
Dept: HYPERBARIC MEDICINE | Facility: CLINIC | Age: 79
End: 2024-05-03

## 2024-05-06 ENCOUNTER — APPOINTMENT (OUTPATIENT)
Dept: HYPERBARIC MEDICINE | Facility: CLINIC | Age: 79
End: 2024-05-06

## 2024-05-07 ENCOUNTER — APPOINTMENT (OUTPATIENT)
Dept: HYPERBARIC MEDICINE | Facility: CLINIC | Age: 79
End: 2024-05-07

## 2024-05-08 ENCOUNTER — APPOINTMENT (OUTPATIENT)
Dept: HYPERBARIC MEDICINE | Facility: CLINIC | Age: 79
End: 2024-05-08

## 2024-05-09 ENCOUNTER — APPOINTMENT (OUTPATIENT)
Dept: HYPERBARIC MEDICINE | Facility: CLINIC | Age: 79
End: 2024-05-09

## 2024-05-10 ENCOUNTER — APPOINTMENT (OUTPATIENT)
Dept: HYPERBARIC MEDICINE | Facility: CLINIC | Age: 79
End: 2024-05-10

## 2024-05-13 ENCOUNTER — APPOINTMENT (OUTPATIENT)
Dept: HYPERBARIC MEDICINE | Facility: CLINIC | Age: 79
End: 2024-05-13

## 2024-05-13 ENCOUNTER — APPOINTMENT (OUTPATIENT)
Dept: ELECTROPHYSIOLOGY | Facility: CLINIC | Age: 79
End: 2024-05-13
Payer: MEDICARE

## 2024-05-13 ENCOUNTER — NON-APPOINTMENT (OUTPATIENT)
Age: 79
End: 2024-05-13

## 2024-05-13 PROCEDURE — 93294 REM INTERROG EVL PM/LDLS PM: CPT

## 2024-05-13 PROCEDURE — 93296 REM INTERROG EVL PM/IDS: CPT

## 2024-05-14 ENCOUNTER — APPOINTMENT (OUTPATIENT)
Dept: HYPERBARIC MEDICINE | Facility: CLINIC | Age: 79
End: 2024-05-14

## 2024-05-15 ENCOUNTER — APPOINTMENT (OUTPATIENT)
Dept: HYPERBARIC MEDICINE | Facility: CLINIC | Age: 79
End: 2024-05-15

## 2024-05-16 ENCOUNTER — APPOINTMENT (OUTPATIENT)
Dept: HYPERBARIC MEDICINE | Facility: CLINIC | Age: 79
End: 2024-05-16

## 2024-05-17 ENCOUNTER — APPOINTMENT (OUTPATIENT)
Dept: HYPERBARIC MEDICINE | Facility: CLINIC | Age: 79
End: 2024-05-17

## 2024-05-20 ENCOUNTER — APPOINTMENT (OUTPATIENT)
Dept: HYPERBARIC MEDICINE | Facility: CLINIC | Age: 79
End: 2024-05-20

## 2024-05-21 ENCOUNTER — APPOINTMENT (OUTPATIENT)
Dept: HYPERBARIC MEDICINE | Facility: CLINIC | Age: 79
End: 2024-05-21

## 2024-05-22 ENCOUNTER — APPOINTMENT (OUTPATIENT)
Dept: HYPERBARIC MEDICINE | Facility: CLINIC | Age: 79
End: 2024-05-22

## 2024-05-23 ENCOUNTER — APPOINTMENT (OUTPATIENT)
Dept: HYPERBARIC MEDICINE | Facility: CLINIC | Age: 79
End: 2024-05-23

## 2024-05-24 ENCOUNTER — APPOINTMENT (OUTPATIENT)
Dept: HYPERBARIC MEDICINE | Facility: CLINIC | Age: 79
End: 2024-05-24

## 2024-05-28 ENCOUNTER — APPOINTMENT (OUTPATIENT)
Dept: HYPERBARIC MEDICINE | Facility: CLINIC | Age: 79
End: 2024-05-28

## 2024-05-29 ENCOUNTER — APPOINTMENT (OUTPATIENT)
Dept: HYPERBARIC MEDICINE | Facility: CLINIC | Age: 79
End: 2024-05-29

## 2024-05-30 ENCOUNTER — APPOINTMENT (OUTPATIENT)
Dept: HYPERBARIC MEDICINE | Facility: CLINIC | Age: 79
End: 2024-05-30

## 2024-05-31 ENCOUNTER — APPOINTMENT (OUTPATIENT)
Dept: HYPERBARIC MEDICINE | Facility: CLINIC | Age: 79
End: 2024-05-31

## 2024-06-05 ENCOUNTER — RX RENEWAL (OUTPATIENT)
Age: 79
End: 2024-06-05

## 2024-06-05 RX ORDER — SIMVASTATIN 10 MG/1
10 TABLET, FILM COATED ORAL
Qty: 90 | Refills: 0 | Status: ACTIVE | COMMUNITY
Start: 2021-12-15 | End: 1900-01-01

## 2024-07-16 ENCOUNTER — NON-APPOINTMENT (OUTPATIENT)
Age: 79
End: 2024-07-16

## 2024-07-16 ENCOUNTER — APPOINTMENT (OUTPATIENT)
Dept: CARDIOLOGY | Facility: CLINIC | Age: 79
End: 2024-07-16
Payer: MEDICARE

## 2024-07-16 VITALS
DIASTOLIC BLOOD PRESSURE: 76 MMHG | OXYGEN SATURATION: 97 % | WEIGHT: 228 LBS | RESPIRATION RATE: 17 BRPM | HEART RATE: 72 BPM | SYSTOLIC BLOOD PRESSURE: 147 MMHG | BODY MASS INDEX: 31.92 KG/M2 | HEIGHT: 71 IN

## 2024-07-16 DIAGNOSIS — Z95.0 PRESENCE OF CARDIAC PACEMAKER: ICD-10-CM

## 2024-07-16 DIAGNOSIS — I10 ESSENTIAL (PRIMARY) HYPERTENSION: ICD-10-CM

## 2024-07-16 DIAGNOSIS — I48.91 UNSPECIFIED ATRIAL FIBRILLATION: ICD-10-CM

## 2024-07-16 DIAGNOSIS — Z95.3 PRESENCE OF XENOGENIC HEART VALVE: ICD-10-CM

## 2024-07-16 DIAGNOSIS — D45 POLYCYTHEMIA VERA: ICD-10-CM

## 2024-07-16 DIAGNOSIS — I48.0 PAROXYSMAL ATRIAL FIBRILLATION: ICD-10-CM

## 2024-07-16 DIAGNOSIS — I44.7 LEFT BUNDLE-BRANCH BLOCK, UNSPECIFIED: ICD-10-CM

## 2024-07-16 DIAGNOSIS — Z95.1 PRESENCE OF AORTOCORONARY BYPASS GRAFT: ICD-10-CM

## 2024-07-16 PROCEDURE — 99214 OFFICE O/P EST MOD 30 MIN: CPT

## 2024-07-16 PROCEDURE — G2211 COMPLEX E/M VISIT ADD ON: CPT

## 2024-07-16 PROCEDURE — 93000 ELECTROCARDIOGRAM COMPLETE: CPT

## 2024-07-24 ENCOUNTER — RX RENEWAL (OUTPATIENT)
Age: 79
End: 2024-07-24

## 2024-08-11 ENCOUNTER — NON-APPOINTMENT (OUTPATIENT)
Age: 79
End: 2024-08-11

## 2024-08-12 ENCOUNTER — APPOINTMENT (OUTPATIENT)
Dept: ELECTROPHYSIOLOGY | Facility: CLINIC | Age: 79
End: 2024-08-12
Payer: MEDICARE

## 2024-08-12 PROCEDURE — 93296 REM INTERROG EVL PM/IDS: CPT

## 2024-08-12 PROCEDURE — 93294 REM INTERROG EVL PM/LDLS PM: CPT

## 2024-08-13 ENCOUNTER — RX RENEWAL (OUTPATIENT)
Age: 79
End: 2024-08-13

## 2024-08-21 ENCOUNTER — RX RENEWAL (OUTPATIENT)
Age: 79
End: 2024-08-21

## 2024-10-28 ENCOUNTER — NON-APPOINTMENT (OUTPATIENT)
Age: 79
End: 2024-10-28

## 2024-10-28 ENCOUNTER — APPOINTMENT (OUTPATIENT)
Dept: CARDIOLOGY | Facility: CLINIC | Age: 79
End: 2024-10-28
Payer: MEDICARE

## 2024-10-28 VITALS
SYSTOLIC BLOOD PRESSURE: 100 MMHG | HEART RATE: 71 BPM | DIASTOLIC BLOOD PRESSURE: 64 MMHG | BODY MASS INDEX: 29.82 KG/M2 | HEIGHT: 71 IN | WEIGHT: 213 LBS | OXYGEN SATURATION: 100 %

## 2024-10-28 DIAGNOSIS — I10 ESSENTIAL (PRIMARY) HYPERTENSION: ICD-10-CM

## 2024-10-28 DIAGNOSIS — Z95.3 PRESENCE OF XENOGENIC HEART VALVE: ICD-10-CM

## 2024-10-28 DIAGNOSIS — R06.02 SHORTNESS OF BREATH: ICD-10-CM

## 2024-10-28 DIAGNOSIS — I25.9 CHRONIC ISCHEMIC HEART DISEASE, UNSPECIFIED: ICD-10-CM

## 2024-10-28 DIAGNOSIS — I44.7 LEFT BUNDLE-BRANCH BLOCK, UNSPECIFIED: ICD-10-CM

## 2024-10-28 DIAGNOSIS — D45 POLYCYTHEMIA VERA: ICD-10-CM

## 2024-10-28 DIAGNOSIS — Z95.1 PRESENCE OF AORTOCORONARY BYPASS GRAFT: ICD-10-CM

## 2024-10-28 DIAGNOSIS — I48.91 UNSPECIFIED ATRIAL FIBRILLATION: ICD-10-CM

## 2024-10-28 DIAGNOSIS — Z95.0 PRESENCE OF CARDIAC PACEMAKER: ICD-10-CM

## 2024-10-28 PROCEDURE — 93000 ELECTROCARDIOGRAM COMPLETE: CPT

## 2024-10-28 PROCEDURE — G2211 COMPLEX E/M VISIT ADD ON: CPT

## 2024-10-28 PROCEDURE — 99214 OFFICE O/P EST MOD 30 MIN: CPT

## 2024-10-29 ENCOUNTER — RX RENEWAL (OUTPATIENT)
Age: 79
End: 2024-10-29

## 2024-11-15 ENCOUNTER — APPOINTMENT (OUTPATIENT)
Dept: ELECTROPHYSIOLOGY | Facility: CLINIC | Age: 79
End: 2024-11-15

## 2024-11-15 PROCEDURE — 93294 REM INTERROG EVL PM/LDLS PM: CPT

## 2024-11-15 PROCEDURE — 93296 REM INTERROG EVL PM/IDS: CPT

## 2024-11-18 ENCOUNTER — NON-APPOINTMENT (OUTPATIENT)
Age: 79
End: 2024-11-18

## 2024-11-18 ENCOUNTER — APPOINTMENT (OUTPATIENT)
Dept: CARDIOLOGY | Facility: CLINIC | Age: 79
End: 2024-11-18
Payer: MEDICARE

## 2024-11-18 VITALS
BODY MASS INDEX: 29.43 KG/M2 | WEIGHT: 211 LBS | DIASTOLIC BLOOD PRESSURE: 70 MMHG | SYSTOLIC BLOOD PRESSURE: 140 MMHG | HEART RATE: 79 BPM | OXYGEN SATURATION: 99 %

## 2024-11-18 DIAGNOSIS — D45 POLYCYTHEMIA VERA: ICD-10-CM

## 2024-11-18 DIAGNOSIS — I10 ESSENTIAL (PRIMARY) HYPERTENSION: ICD-10-CM

## 2024-11-18 DIAGNOSIS — I48.91 UNSPECIFIED ATRIAL FIBRILLATION: ICD-10-CM

## 2024-11-18 PROCEDURE — 93000 ELECTROCARDIOGRAM COMPLETE: CPT

## 2024-11-18 PROCEDURE — G2211 COMPLEX E/M VISIT ADD ON: CPT

## 2024-11-18 PROCEDURE — 99214 OFFICE O/P EST MOD 30 MIN: CPT

## 2024-11-18 RX ORDER — FERROUS SULFATE TAB EC 325 MG (65 MG FE EQUIVALENT) 325 (65 FE) MG
325 (65 FE) TABLET DELAYED RESPONSE ORAL DAILY
Refills: 0 | Status: ACTIVE | COMMUNITY

## 2024-11-18 RX ORDER — METHOCARBAMOL 500 MG/1
500 TABLET, FILM COATED ORAL 3 TIMES DAILY
Refills: 0 | Status: ACTIVE | COMMUNITY

## 2024-11-18 RX ORDER — MESALAMINE 1000 MG/1
1000 SUPPOSITORY RECTAL
Refills: 0 | Status: ACTIVE | COMMUNITY

## 2025-01-17 ENCOUNTER — APPOINTMENT (OUTPATIENT)
Dept: CARDIOLOGY | Facility: CLINIC | Age: 80
End: 2025-01-17
Payer: MEDICARE

## 2025-01-17 VITALS
WEIGHT: 216 LBS | HEART RATE: 65 BPM | DIASTOLIC BLOOD PRESSURE: 65 MMHG | OXYGEN SATURATION: 96 % | BODY MASS INDEX: 30.13 KG/M2 | SYSTOLIC BLOOD PRESSURE: 112 MMHG

## 2025-01-17 DIAGNOSIS — Z95.0 PRESENCE OF CARDIAC PACEMAKER: ICD-10-CM

## 2025-01-17 DIAGNOSIS — I44.7 LEFT BUNDLE-BRANCH BLOCK, UNSPECIFIED: ICD-10-CM

## 2025-01-17 DIAGNOSIS — C61 MALIGNANT NEOPLASM OF PROSTATE: ICD-10-CM

## 2025-01-17 DIAGNOSIS — Z95.3 PRESENCE OF XENOGENIC HEART VALVE: ICD-10-CM

## 2025-01-17 DIAGNOSIS — D45 POLYCYTHEMIA VERA: ICD-10-CM

## 2025-01-17 DIAGNOSIS — Z95.1 PRESENCE OF AORTOCORONARY BYPASS GRAFT: ICD-10-CM

## 2025-01-17 DIAGNOSIS — R60.0 LOCALIZED EDEMA: ICD-10-CM

## 2025-01-17 PROCEDURE — G2211 COMPLEX E/M VISIT ADD ON: CPT

## 2025-01-17 PROCEDURE — 99214 OFFICE O/P EST MOD 30 MIN: CPT

## 2025-01-28 ENCOUNTER — APPOINTMENT (OUTPATIENT)
Dept: INTERNAL MEDICINE | Facility: CLINIC | Age: 80
End: 2025-01-28

## 2025-02-12 ENCOUNTER — RX RENEWAL (OUTPATIENT)
Age: 80
End: 2025-02-12

## 2025-02-13 ENCOUNTER — APPOINTMENT (OUTPATIENT)
Dept: ELECTROPHYSIOLOGY | Facility: CLINIC | Age: 80
End: 2025-02-13

## 2025-02-21 ENCOUNTER — APPOINTMENT (OUTPATIENT)
Dept: ELECTROPHYSIOLOGY | Facility: CLINIC | Age: 80
End: 2025-02-21
Payer: MEDICARE

## 2025-02-21 ENCOUNTER — APPOINTMENT (OUTPATIENT)
Dept: CARDIOLOGY | Facility: CLINIC | Age: 80
End: 2025-02-21

## 2025-02-21 ENCOUNTER — NON-APPOINTMENT (OUTPATIENT)
Age: 80
End: 2025-02-21

## 2025-02-21 VITALS
SYSTOLIC BLOOD PRESSURE: 110 MMHG | HEART RATE: 67 BPM | DIASTOLIC BLOOD PRESSURE: 73 MMHG | WEIGHT: 218 LBS | BODY MASS INDEX: 30.52 KG/M2 | HEIGHT: 71 IN | OXYGEN SATURATION: 98 %

## 2025-02-21 PROCEDURE — 93000 ELECTROCARDIOGRAM COMPLETE: CPT | Mod: 59

## 2025-02-21 PROCEDURE — 93280 PM DEVICE PROGR EVAL DUAL: CPT

## 2025-03-04 ENCOUNTER — EMERGENCY (EMERGENCY)
Facility: HOSPITAL | Age: 80
LOS: 1 days | Discharge: HOME | End: 2025-03-04
Attending: EMERGENCY MEDICINE
Payer: MEDICARE

## 2025-03-04 VITALS
WEIGHT: 218.04 LBS | RESPIRATION RATE: 16 BRPM | DIASTOLIC BLOOD PRESSURE: 86 MMHG | TEMPERATURE: 99 F | HEIGHT: 71 IN | OXYGEN SATURATION: 98 % | SYSTOLIC BLOOD PRESSURE: 137 MMHG | HEART RATE: 90 BPM

## 2025-03-04 VITALS
SYSTOLIC BLOOD PRESSURE: 107 MMHG | DIASTOLIC BLOOD PRESSURE: 73 MMHG | RESPIRATION RATE: 18 BRPM | TEMPERATURE: 98 F | HEART RATE: 90 BPM | OXYGEN SATURATION: 95 %

## 2025-03-04 DIAGNOSIS — Z98.89 OTHER SPECIFIED POSTPROCEDURAL STATES: Chronic | ICD-10-CM

## 2025-03-04 DIAGNOSIS — Z95.1 PRESENCE OF AORTOCORONARY BYPASS GRAFT: Chronic | ICD-10-CM

## 2025-03-04 DIAGNOSIS — Z90.49 ACQUIRED ABSENCE OF OTHER SPECIFIED PARTS OF DIGESTIVE TRACT: Chronic | ICD-10-CM

## 2025-03-04 DIAGNOSIS — Z95.0 PRESENCE OF CARDIAC PACEMAKER: Chronic | ICD-10-CM

## 2025-03-04 LAB
ALBUMIN SERPL ELPH-MCNC: 3.6 G/DL — SIGNIFICANT CHANGE UP (ref 3.5–5)
ALP SERPL-CCNC: 65 U/L — SIGNIFICANT CHANGE UP (ref 40–120)
ALT FLD-CCNC: 31 U/L DA — SIGNIFICANT CHANGE UP (ref 10–60)
ANION GAP SERPL CALC-SCNC: 5 MMOL/L — SIGNIFICANT CHANGE UP (ref 5–17)
ANISOCYTOSIS BLD QL: SLIGHT — SIGNIFICANT CHANGE UP
AST SERPL-CCNC: 21 U/L — SIGNIFICANT CHANGE UP (ref 10–40)
BASOPHILS # BLD AUTO: 0 K/UL — SIGNIFICANT CHANGE UP (ref 0–0.2)
BASOPHILS NFR BLD AUTO: 0 % — SIGNIFICANT CHANGE UP (ref 0–2)
BILIRUB SERPL-MCNC: 0.6 MG/DL — SIGNIFICANT CHANGE UP (ref 0.2–1.2)
BLD GP AB SCN SERPL QL: SIGNIFICANT CHANGE UP
BUN SERPL-MCNC: 22 MG/DL — HIGH (ref 7–18)
CALCIUM SERPL-MCNC: 9.9 MG/DL — SIGNIFICANT CHANGE UP (ref 8.4–10.5)
CHLORIDE SERPL-SCNC: 103 MMOL/L — SIGNIFICANT CHANGE UP (ref 96–108)
CO2 SERPL-SCNC: 29 MMOL/L — SIGNIFICANT CHANGE UP (ref 22–31)
CREAT SERPL-MCNC: 1.23 MG/DL — SIGNIFICANT CHANGE UP (ref 0.5–1.3)
EGFR: 60 ML/MIN/1.73M2 — SIGNIFICANT CHANGE UP
EOSINOPHIL # BLD AUTO: 0.43 K/UL — SIGNIFICANT CHANGE UP (ref 0–0.5)
EOSINOPHIL NFR BLD AUTO: 3 % — SIGNIFICANT CHANGE UP (ref 0–6)
GLUCOSE SERPL-MCNC: 161 MG/DL — HIGH (ref 70–99)
HCT VFR BLD CALC: 44.2 % — SIGNIFICANT CHANGE UP (ref 39–50)
HGB BLD-MCNC: 14.6 G/DL — SIGNIFICANT CHANGE UP (ref 13–17)
HYPOCHROMIA BLD QL: SLIGHT — SIGNIFICANT CHANGE UP
LIDOCAIN IGE QN: 46 U/L — SIGNIFICANT CHANGE UP (ref 13–75)
LYMPHOCYTES # BLD AUTO: 1.86 K/UL — SIGNIFICANT CHANGE UP (ref 1–3.3)
LYMPHOCYTES # BLD AUTO: 13 % — SIGNIFICANT CHANGE UP (ref 13–44)
MANUAL SMEAR VERIFICATION: SIGNIFICANT CHANGE UP
MCHC RBC-ENTMCNC: 27.5 PG — SIGNIFICANT CHANGE UP (ref 27–34)
MCHC RBC-ENTMCNC: 33 G/DL — SIGNIFICANT CHANGE UP (ref 32–36)
MCV RBC AUTO: 83.4 FL — SIGNIFICANT CHANGE UP (ref 80–100)
MONOCYTES # BLD AUTO: 1.14 K/UL — HIGH (ref 0–0.9)
MONOCYTES NFR BLD AUTO: 8 % — SIGNIFICANT CHANGE UP (ref 2–14)
MYELOCYTES NFR BLD: 3 % — HIGH (ref 0–0)
NEUTROPHILS # BLD AUTO: 10.3 K/UL — HIGH (ref 1.8–7.4)
NEUTROPHILS NFR BLD AUTO: 72 % — SIGNIFICANT CHANGE UP (ref 43–77)
NRBC # BLD: 0 /100 WBCS — SIGNIFICANT CHANGE UP (ref 0–0)
NRBC BLD-RTO: 0 /100 WBCS — SIGNIFICANT CHANGE UP (ref 0–0)
PLAT MORPH BLD: NORMAL — SIGNIFICANT CHANGE UP
PLATELET # BLD AUTO: 162 K/UL — SIGNIFICANT CHANGE UP (ref 150–400)
PLATELET COUNT - ESTIMATE: NORMAL — SIGNIFICANT CHANGE UP
POIKILOCYTOSIS BLD QL AUTO: SLIGHT — SIGNIFICANT CHANGE UP
POTASSIUM SERPL-MCNC: 3.9 MMOL/L — SIGNIFICANT CHANGE UP (ref 3.5–5.3)
POTASSIUM SERPL-SCNC: 3.9 MMOL/L — SIGNIFICANT CHANGE UP (ref 3.5–5.3)
PROT SERPL-MCNC: 7 G/DL — SIGNIFICANT CHANGE UP (ref 6–8.3)
RBC # BLD: 5.3 M/UL — SIGNIFICANT CHANGE UP (ref 4.2–5.8)
RBC # FLD: 15.6 % — HIGH (ref 10.3–14.5)
RBC BLD AUTO: ABNORMAL
SODIUM SERPL-SCNC: 137 MMOL/L — SIGNIFICANT CHANGE UP (ref 135–145)
VARIANT LYMPHS # BLD: 1 % — SIGNIFICANT CHANGE UP (ref 0–6)
VARIANT LYMPHS NFR BLD MANUAL: 1 % — SIGNIFICANT CHANGE UP (ref 0–6)
WBC # BLD: 14.31 K/UL — HIGH (ref 3.8–10.5)
WBC # FLD AUTO: 14.31 K/UL — HIGH (ref 3.8–10.5)

## 2025-03-04 PROCEDURE — 86901 BLOOD TYPING SEROLOGIC RH(D): CPT

## 2025-03-04 PROCEDURE — 93005 ELECTROCARDIOGRAM TRACING: CPT

## 2025-03-04 PROCEDURE — 86900 BLOOD TYPING SEROLOGIC ABO: CPT

## 2025-03-04 PROCEDURE — 99283 EMERGENCY DEPT VISIT LOW MDM: CPT | Mod: 25

## 2025-03-04 PROCEDURE — 85025 COMPLETE CBC W/AUTO DIFF WBC: CPT

## 2025-03-04 PROCEDURE — 83690 ASSAY OF LIPASE: CPT

## 2025-03-04 PROCEDURE — 30903 CONTROL OF NOSEBLEED: CPT | Mod: RT

## 2025-03-04 PROCEDURE — 36415 COLL VENOUS BLD VENIPUNCTURE: CPT

## 2025-03-04 PROCEDURE — 86850 RBC ANTIBODY SCREEN: CPT

## 2025-03-04 PROCEDURE — 99284 EMERGENCY DEPT VISIT MOD MDM: CPT | Mod: 25

## 2025-03-04 PROCEDURE — 80053 COMPREHEN METABOLIC PANEL: CPT

## 2025-03-04 PROCEDURE — 30901 CONTROL OF NOSEBLEED: CPT | Mod: RT

## 2025-03-04 RX ADMIN — Medication 2 SPRAY(S): at 06:00

## 2025-03-04 NOTE — ED PROVIDER NOTE - PATIENT PORTAL LINK FT
You can access the FollowMyHealth Patient Portal offered by [facility] by registering at the following website: [email or web address]. By joining [facility]’s FollowMyHealth portal, you will also be able to view your health information using other applications (apps) compatible with our system.

## 2025-03-04 NOTE — ED PROVIDER NOTE - PROGRESS NOTE DETAILS
No further bleeding noted.  Educated patient on lab work and follow-up for packing removal.  Discussed signs and symptoms to return sooner to ED. Patient wanted removal of packing.  Once packing was removed patient started bleeding again.  Patient agreed to packing placement again.  Discussed care and follow-up.

## 2025-03-04 NOTE — ED PROCEDURE NOTE - CPROC ED NASAL DETAIL1
The source of the bleeding was clearly identified./The anatomic location was packed.
The source of the bleeding was clearly identified./The anatomic location was packed.

## 2025-03-04 NOTE — ED ADULT NURSE NOTE - OBJECTIVE STATEMENT
pt c/o epistaxis that started at 2am this morning. pt denies lightheadedness and dizziness. pt states he is on xaralto and has pacemaker. pt denies chest pain, SOB.

## 2025-03-04 NOTE — ED PROVIDER NOTE - CLINICAL SUMMARY MEDICAL DECISION MAKING FREE TEXT BOX
79-year-old male with A-fib on Xarelto here for nosebleed.  Currently no active bleeding.  Visible area on bleeding right medial septum.  Plan to provide Afrin spray, nasal packing check labs and reassess.

## 2025-03-04 NOTE — ED PROVIDER NOTE - NSFOLLOWUPINSTRUCTIONS_ED_ALL_ED_FT
Nosebleed    WHAT YOU NEED TO KNOW:    What do I need to know about a nosebleed? A nosebleed, or epistaxis, occurs when one or more of the blood vessels in your nose break. You may have dark or bright red blood from one or both nostrils. A nosebleed can be caused by any of the following:    Cold, dry air    Trauma from picking your nose or a direct blow to your nose    Abnormal nose structure, such as a deviated septum    Irritation or inflammation from a cold, respiratory infection, or allergies    An object stuck in your nose    Certain medicines, such as blood thinners  How is a nosebleed diagnosed?    A nasal exam may show blood clots or swelling. Your healthcare provider will use an instrument called a speculum to check the inside of your nose. This gently opens your nostrils so your healthcare provider can see what part of your nose is bleeding.    A nasal endoscopy is a deeper exam of the inside of your nose. Your healthcare provider uses a scope (thin, flexible tube with a light and camera on the end) to see further into your nose.  What first aid should I do for a nosebleed?    Sit up and lean forward. This will help prevent you from swallowing blood. Spit blood and saliva into a bowl.    Apply pressure to your nose. Use 2 fingers to pinch your nose shut for 10 to 15 minutes. This will help stop the bleeding.    Apply ice on the bridge of your nose to decrease swelling and bleeding. Use a cold pack or put crushed ice in a plastic bag. Cover it with a towel to protect your skin.    Pack your nose with a cotton ball, tissue, tampon, or gauze bandage to stop the bleeding.  How is a severe nosebleed treated? You may need any of the following if the bleeding does not stop after first aid is done:    Medicines may be applied to a small piece of cotton and placed in your nose. Medicine may also be sprayed in or applied directly to your nose. You may need medicine to prevent an infection. If bleeding is severe, medicine may be injected into a blood vessel in your nose.    Cautery is when a chemical or electric device is used to seal the blood vessels. This may be done to stop bleeding or prevent more bleeding. Local anesthesia may be used.    Nasal packing is when layers of gauze are placed in your nose to provide pressure and stop the bleeding. Local anesthesia may be used. Nasal packing is usually removed in 2 to 3 days.    Embolization is a procedure used to stop the bleeding from inside your nose. Medical glue or a small balloon device may be used to clog the blood vessels in your nose.    Surgery may be needed if your nosebleed returns over and over long-term. An artery may be tied to stop bleeding. Damaged tissue or an abnormal structure in your nose may be repaired.  How can I help prevent another nosebleed?    Keep your nose moist. Put a small amount of petroleum jelly inside your nostrils as needed. Use a saline (saltwater) nasal spray. Do not put anything else inside your nose unless your healthcare provider says it is okay. Do not use oil-based lubricants if you use oxygen therapy. They may be flammable.    Use a cool mist humidifier to increase air moisture in your home. This will help your nose stay moist.  Humidifier      Do not pick or blow your nose for at least a week. You can irritate or damage your nose if you pick it. Blowing your nose too hard may cause the bleeding to start again. Do not bend over or strain as this can cause the bleeding to start again.    Avoid irritants such as tobacco smoke or chemical sprays such as .  When should I seek immediate care?    Your nose is still bleeding after 20 minutes, even after you pinch it.    Your nasal packing is soaked with blood.    You have a foul-smelling discharge coming out of your nose.    You feel so weak and dizzy that you have trouble standing up.    You have trouble breathing or talking.  When should I contact my healthcare provider?    You have a fever and are vomiting.    You have pain in and around your nose.    Your nasal pack is loose.    You have questions or concerns about your condition or care.  CARE AGREEMENT:    You have the right to help plan your care. Learn about your health condition and how it may be treated. Discuss treatment options with your healthcare providers to decide what care you want to receive. You always have the right to refuse treatment.    © [employer] 1973, 2025    	  back to top

## 2025-03-04 NOTE — ED ADULT NURSE NOTE - NSFALLUNIVINTERV_ED_ALL_ED
Bed/Stretcher in lowest position, wheels locked, appropriate side rails in place/Call bell, personal items and telephone in reach/Instruct patient to call for assistance before getting out of bed/chair/stretcher/Non-slip footwear applied when patient is off stretcher/[place] to call system/Physically safe environment - no spills, clutter or unnecessary equipment/Purposeful proactive rounding/Room/bathroom lighting operational, light cord in reach

## 2025-03-04 NOTE — ED PROVIDER NOTE - OBJECTIVE STATEMENT
79-year-old male with history of CAD, A-fib on Xarelto presenting with nosebleed that he noted around 2 AM while going to bathroom.  Patient was urinating and noticed bleeding from right nostril.  Bleeding was intermittent controlled since then.  Relates weakness after bleeding started.  Denies any chest pain, shortness of breath or coughing blood.  Denies any trauma.  History of nosebleed last year which resolved after 10 minutes of pressure.

## 2025-03-04 NOTE — ED ADULT TRIAGE NOTE - CHIEF COMPLAINT QUOTE
epistaxis starting at 2am. Denies dizziness.  on Xarelto PMH Prostate ca, Pacemaker, Left bundle block Evolut FX implanted

## 2025-04-23 ENCOUNTER — RX RENEWAL (OUTPATIENT)
Age: 80
End: 2025-04-23

## 2025-04-29 ENCOUNTER — NON-APPOINTMENT (OUTPATIENT)
Age: 80
End: 2025-04-29

## 2025-05-01 ENCOUNTER — APPOINTMENT (OUTPATIENT)
Dept: INTERNAL MEDICINE | Facility: CLINIC | Age: 80
End: 2025-05-01

## 2025-05-01 ENCOUNTER — LABORATORY RESULT (OUTPATIENT)
Age: 80
End: 2025-05-01

## 2025-05-01 VITALS
WEIGHT: 220 LBS | BODY MASS INDEX: 32.58 KG/M2 | HEART RATE: 83 BPM | SYSTOLIC BLOOD PRESSURE: 155 MMHG | OXYGEN SATURATION: 97 % | DIASTOLIC BLOOD PRESSURE: 92 MMHG | HEIGHT: 69 IN

## 2025-05-01 DIAGNOSIS — I10 ESSENTIAL (PRIMARY) HYPERTENSION: ICD-10-CM

## 2025-05-01 DIAGNOSIS — D45 POLYCYTHEMIA VERA: ICD-10-CM

## 2025-05-01 DIAGNOSIS — I48.91 UNSPECIFIED ATRIAL FIBRILLATION: ICD-10-CM

## 2025-05-01 DIAGNOSIS — Z00.00 ENCOUNTER FOR GENERAL ADULT MEDICAL EXAMINATION W/OUT ABNORMAL FINDINGS: ICD-10-CM

## 2025-05-01 DIAGNOSIS — G25.2 OTHER SPECIFIED FORMS OF TREMOR: ICD-10-CM

## 2025-05-01 DIAGNOSIS — K62.7 RADIATION PROCTITIS: ICD-10-CM

## 2025-05-01 PROCEDURE — 36415 COLL VENOUS BLD VENIPUNCTURE: CPT

## 2025-05-01 PROCEDURE — 99213 OFFICE O/P EST LOW 20 MIN: CPT | Mod: 25

## 2025-05-01 PROCEDURE — G0438: CPT

## 2025-05-01 RX ORDER — METHYLPREDNISOLONE 4 MG/1
4 TABLET ORAL
Refills: 0 | Status: ACTIVE | COMMUNITY

## 2025-05-01 RX ORDER — ATENOLOL 25 MG/1
25 TABLET ORAL
Refills: 0 | Status: ACTIVE | COMMUNITY

## 2025-05-02 PROBLEM — K62.7 RADIATION PROCTITIS: Status: ACTIVE | Noted: 2025-05-02

## 2025-05-02 LAB
25(OH)D3 SERPL-MCNC: 38.3 NG/ML
ALBUMIN SERPL ELPH-MCNC: 4.5 G/DL
ALP BLD-CCNC: 77 U/L
ALT SERPL-CCNC: 24 U/L
ANION GAP SERPL CALC-SCNC: 21 MMOL/L
AST SERPL-CCNC: 21 U/L
BASOPHILS # BLD AUTO: 0.25 K/UL
BASOPHILS NFR BLD AUTO: 1.8 %
BILIRUB SERPL-MCNC: 0.9 MG/DL
BUN SERPL-MCNC: 17 MG/DL
CALCIUM SERPL-MCNC: 10.5 MG/DL
CHLORIDE SERPL-SCNC: 102 MMOL/L
CHOLEST SERPL-MCNC: 128 MG/DL
CO2 SERPL-SCNC: 21 MMOL/L
CREAT SERPL-MCNC: 1.28 MG/DL
EGFRCR SERPLBLD CKD-EPI 2021: 57 ML/MIN/1.73M2
EOSINOPHIL # BLD AUTO: 0.25 K/UL
EOSINOPHIL NFR BLD AUTO: 1.8 %
ESTIMATED AVERAGE GLUCOSE: 123 MG/DL
FERRITIN SERPL-MCNC: 65 NG/ML
FOLATE SERPL-MCNC: >20 NG/ML
GLUCOSE SERPL-MCNC: 138 MG/DL
HBA1C MFR BLD HPLC: 5.9 %
HCT VFR BLD CALC: 46.2 %
HDLC SERPL-MCNC: 28 MG/DL
HGB BLD-MCNC: 15.5 G/DL
IRON SATN MFR SERPL: 17 %
IRON SERPL-MCNC: 67 UG/DL
LDLC SERPL-MCNC: 78 MG/DL
LYMPHOCYTES # BLD AUTO: 1.32 K/UL
LYMPHOCYTES NFR BLD AUTO: 9.7 %
MAN DIFF?: NORMAL
MCHC RBC-ENTMCNC: 28.5 PG
MCHC RBC-ENTMCNC: 33.5 G/DL
MCV RBC AUTO: 85.1 FL
MONOCYTES # BLD AUTO: 1.69 K/UL
MONOCYTES NFR BLD AUTO: 12.4 %
NEUTROPHILS # BLD AUTO: 10.12 K/UL
NEUTROPHILS NFR BLD AUTO: 74.3 %
NONHDLC SERPL-MCNC: 100 MG/DL
PLATELET # BLD AUTO: 159 K/UL
POTASSIUM SERPL-SCNC: 4.4 MMOL/L
PROT SERPL-MCNC: 7.1 G/DL
RBC # BLD: 5.43 M/UL
RBC # FLD: 16 %
SODIUM SERPL-SCNC: 143 MMOL/L
TIBC SERPL-MCNC: 403 UG/DL
TRIGL SERPL-MCNC: 120 MG/DL
TSH SERPL-ACNC: 1.38 UIU/ML
UIBC SERPL-MCNC: 336 UG/DL
VIT B12 SERPL-MCNC: 298 PG/ML
WBC # FLD AUTO: 13.62 K/UL

## 2025-05-08 ENCOUNTER — RX RENEWAL (OUTPATIENT)
Age: 80
End: 2025-05-08

## 2025-05-10 ENCOUNTER — NON-APPOINTMENT (OUTPATIENT)
Age: 80
End: 2025-05-10

## 2025-05-12 ENCOUNTER — APPOINTMENT (OUTPATIENT)
Dept: CARDIOLOGY | Facility: CLINIC | Age: 80
End: 2025-05-12
Payer: MEDICARE

## 2025-05-12 ENCOUNTER — NON-APPOINTMENT (OUTPATIENT)
Age: 80
End: 2025-05-12

## 2025-05-12 VITALS
OXYGEN SATURATION: 98 % | DIASTOLIC BLOOD PRESSURE: 78 MMHG | BODY MASS INDEX: 32.29 KG/M2 | HEART RATE: 75 BPM | HEIGHT: 69 IN | WEIGHT: 218 LBS | SYSTOLIC BLOOD PRESSURE: 122 MMHG

## 2025-05-12 DIAGNOSIS — R60.0 LOCALIZED EDEMA: ICD-10-CM

## 2025-05-12 DIAGNOSIS — D45 POLYCYTHEMIA VERA: ICD-10-CM

## 2025-05-12 DIAGNOSIS — I25.9 CHRONIC ISCHEMIC HEART DISEASE, UNSPECIFIED: ICD-10-CM

## 2025-05-12 DIAGNOSIS — Z95.1 PRESENCE OF AORTOCORONARY BYPASS GRAFT: ICD-10-CM

## 2025-05-12 DIAGNOSIS — Z95.0 PRESENCE OF CARDIAC PACEMAKER: ICD-10-CM

## 2025-05-12 DIAGNOSIS — I48.91 UNSPECIFIED ATRIAL FIBRILLATION: ICD-10-CM

## 2025-05-12 DIAGNOSIS — N30.40 IRRADIATION CYSTITIS W/OUT HEMATURIA: ICD-10-CM

## 2025-05-12 DIAGNOSIS — Z95.3 PRESENCE OF XENOGENIC HEART VALVE: ICD-10-CM

## 2025-05-12 DIAGNOSIS — I10 ESSENTIAL (PRIMARY) HYPERTENSION: ICD-10-CM

## 2025-05-12 DIAGNOSIS — C61 MALIGNANT NEOPLASM OF PROSTATE: ICD-10-CM

## 2025-05-12 DIAGNOSIS — N52.9 MALE ERECTILE DYSFUNCTION, UNSPECIFIED: ICD-10-CM

## 2025-05-12 DIAGNOSIS — M53.9 DORSOPATHY, UNSPECIFIED: ICD-10-CM

## 2025-05-12 DIAGNOSIS — R06.02 SHORTNESS OF BREATH: ICD-10-CM

## 2025-05-12 PROCEDURE — 93000 ELECTROCARDIOGRAM COMPLETE: CPT

## 2025-05-12 PROCEDURE — 99214 OFFICE O/P EST MOD 30 MIN: CPT

## 2025-05-12 PROCEDURE — G2211 COMPLEX E/M VISIT ADD ON: CPT

## 2025-05-12 RX ORDER — MESALAMINE 1000 MG/1
1000 SUPPOSITORY RECTAL
Refills: 0 | Status: ACTIVE | COMMUNITY

## 2025-05-12 RX ORDER — DIAZEPAM 10 MG/1
10 TABLET ORAL
Refills: 0 | Status: ACTIVE | COMMUNITY

## 2025-05-12 RX ORDER — TROSPIUM CHLORIDE 20 MG/1
20 TABLET, FILM COATED ORAL
Refills: 0 | Status: ACTIVE | COMMUNITY

## 2025-05-12 RX ORDER — RIVAROXABAN 2.5 MG/1
2.5 TABLET, FILM COATED ORAL
Refills: 0 | Status: ACTIVE | COMMUNITY

## 2025-05-22 DIAGNOSIS — K21.9 GASTRO-ESOPHAGEAL REFLUX DISEASE W/OUT ESOPHAGITIS: ICD-10-CM

## 2025-05-23 ENCOUNTER — NON-APPOINTMENT (OUTPATIENT)
Age: 80
End: 2025-05-23

## 2025-05-23 ENCOUNTER — APPOINTMENT (OUTPATIENT)
Dept: ELECTROPHYSIOLOGY | Facility: CLINIC | Age: 80
End: 2025-05-23

## 2025-05-23 PROCEDURE — 93296 REM INTERROG EVL PM/IDS: CPT

## 2025-05-23 PROCEDURE — 93294 REM INTERROG EVL PM/LDLS PM: CPT

## 2025-06-03 ENCOUNTER — RX RENEWAL (OUTPATIENT)
Age: 80
End: 2025-06-03

## 2025-06-17 ENCOUNTER — APPOINTMENT (OUTPATIENT)
Dept: CT IMAGING | Facility: IMAGING CENTER | Age: 80
End: 2025-06-17

## 2025-06-30 ENCOUNTER — APPOINTMENT (OUTPATIENT)
Dept: CT IMAGING | Facility: IMAGING CENTER | Age: 80
End: 2025-06-30
Payer: MEDICARE

## 2025-06-30 ENCOUNTER — APPOINTMENT (OUTPATIENT)
Dept: NUCLEAR MEDICINE | Facility: IMAGING CENTER | Age: 80
End: 2025-06-30

## 2025-06-30 ENCOUNTER — APPOINTMENT (OUTPATIENT)
Dept: NUCLEAR MEDICINE | Facility: IMAGING CENTER | Age: 80
End: 2025-06-30
Payer: MEDICARE

## 2025-06-30 ENCOUNTER — OUTPATIENT (OUTPATIENT)
Dept: OUTPATIENT SERVICES | Facility: HOSPITAL | Age: 80
LOS: 1 days | End: 2025-06-30
Payer: MEDICARE

## 2025-06-30 DIAGNOSIS — Z95.0 PRESENCE OF CARDIAC PACEMAKER: Chronic | ICD-10-CM

## 2025-06-30 DIAGNOSIS — Z90.49 ACQUIRED ABSENCE OF OTHER SPECIFIED PARTS OF DIGESTIVE TRACT: Chronic | ICD-10-CM

## 2025-06-30 DIAGNOSIS — Z98.89 OTHER SPECIFIED POSTPROCEDURAL STATES: Chronic | ICD-10-CM

## 2025-06-30 DIAGNOSIS — Z95.1 PRESENCE OF AORTOCORONARY BYPASS GRAFT: Chronic | ICD-10-CM

## 2025-06-30 DIAGNOSIS — Z00.8 ENCOUNTER FOR OTHER GENERAL EXAMINATION: ICD-10-CM

## 2025-06-30 PROCEDURE — 78803 RP LOCLZJ TUM SPECT 1 AREA: CPT | Mod: 26

## 2025-06-30 PROCEDURE — 70450 CT HEAD/BRAIN W/O DYE: CPT | Mod: 26

## 2025-06-30 PROCEDURE — 78803 RP LOCLZJ TUM SPECT 1 AREA: CPT

## 2025-06-30 PROCEDURE — A9584: CPT

## 2025-06-30 PROCEDURE — 70450 CT HEAD/BRAIN W/O DYE: CPT

## 2025-07-22 ENCOUNTER — OUTPATIENT (OUTPATIENT)
Dept: OUTPATIENT SERVICES | Facility: HOSPITAL | Age: 80
LOS: 1 days | End: 2025-07-22
Payer: MEDICARE

## 2025-07-22 ENCOUNTER — APPOINTMENT (OUTPATIENT)
Dept: CT IMAGING | Facility: IMAGING CENTER | Age: 80
End: 2025-07-22
Payer: MEDICARE

## 2025-07-22 DIAGNOSIS — Z90.49 ACQUIRED ABSENCE OF OTHER SPECIFIED PARTS OF DIGESTIVE TRACT: Chronic | ICD-10-CM

## 2025-07-22 DIAGNOSIS — Z95.1 PRESENCE OF AORTOCORONARY BYPASS GRAFT: Chronic | ICD-10-CM

## 2025-07-22 DIAGNOSIS — Z98.89 OTHER SPECIFIED POSTPROCEDURAL STATES: Chronic | ICD-10-CM

## 2025-07-22 DIAGNOSIS — Z95.0 PRESENCE OF CARDIAC PACEMAKER: Chronic | ICD-10-CM

## 2025-07-22 DIAGNOSIS — I63.9 CEREBRAL INFARCTION, UNSPECIFIED: ICD-10-CM

## 2025-07-22 DIAGNOSIS — Z00.8 ENCOUNTER FOR OTHER GENERAL EXAMINATION: ICD-10-CM

## 2025-07-22 PROCEDURE — 72131 CT LUMBAR SPINE W/O DYE: CPT

## 2025-07-22 PROCEDURE — 72131 CT LUMBAR SPINE W/O DYE: CPT | Mod: 26

## 2025-08-05 ENCOUNTER — RX RENEWAL (OUTPATIENT)
Age: 80
End: 2025-08-05

## 2025-08-07 ENCOUNTER — APPOINTMENT (OUTPATIENT)
Dept: ULTRASOUND IMAGING | Facility: IMAGING CENTER | Age: 80
End: 2025-08-07

## 2025-08-18 ENCOUNTER — APPOINTMENT (OUTPATIENT)
Dept: CARDIOLOGY | Facility: CLINIC | Age: 80
End: 2025-08-18

## 2025-08-19 ENCOUNTER — APPOINTMENT (OUTPATIENT)
Dept: INTERNAL MEDICINE | Facility: CLINIC | Age: 80
End: 2025-08-19

## 2025-08-19 ENCOUNTER — APPOINTMENT (OUTPATIENT)
Dept: ULTRASOUND IMAGING | Facility: CLINIC | Age: 80
End: 2025-08-19
Payer: MEDICARE

## 2025-08-19 ENCOUNTER — OUTPATIENT (OUTPATIENT)
Dept: OUTPATIENT SERVICES | Facility: HOSPITAL | Age: 80
LOS: 1 days | End: 2025-08-19
Payer: MEDICARE

## 2025-08-19 DIAGNOSIS — Z98.89 OTHER SPECIFIED POSTPROCEDURAL STATES: Chronic | ICD-10-CM

## 2025-08-19 DIAGNOSIS — Z00.8 ENCOUNTER FOR OTHER GENERAL EXAMINATION: ICD-10-CM

## 2025-08-19 DIAGNOSIS — Z95.0 PRESENCE OF CARDIAC PACEMAKER: Chronic | ICD-10-CM

## 2025-08-19 DIAGNOSIS — Z90.49 ACQUIRED ABSENCE OF OTHER SPECIFIED PARTS OF DIGESTIVE TRACT: Chronic | ICD-10-CM

## 2025-08-19 DIAGNOSIS — R31.9 HEMATURIA, UNSPECIFIED: ICD-10-CM

## 2025-08-19 DIAGNOSIS — Z95.1 PRESENCE OF AORTOCORONARY BYPASS GRAFT: Chronic | ICD-10-CM

## 2025-08-19 PROCEDURE — 76775 US EXAM ABDO BACK WALL LIM: CPT

## 2025-08-19 PROCEDURE — 76775 US EXAM ABDO BACK WALL LIM: CPT | Mod: 26

## 2025-08-22 ENCOUNTER — NON-APPOINTMENT (OUTPATIENT)
Age: 80
End: 2025-08-22

## 2025-08-22 ENCOUNTER — APPOINTMENT (OUTPATIENT)
Dept: ELECTROPHYSIOLOGY | Facility: CLINIC | Age: 80
End: 2025-08-22

## 2025-08-22 PROCEDURE — 93294 REM INTERROG EVL PM/LDLS PM: CPT

## 2025-08-22 PROCEDURE — 93296 REM INTERROG EVL PM/IDS: CPT

## 2025-09-09 ENCOUNTER — NON-APPOINTMENT (OUTPATIENT)
Age: 80
End: 2025-09-09

## 2025-09-09 ENCOUNTER — APPOINTMENT (OUTPATIENT)
Dept: CARDIOLOGY | Facility: CLINIC | Age: 80
End: 2025-09-09
Payer: MEDICARE

## 2025-09-09 VITALS
DIASTOLIC BLOOD PRESSURE: 78 MMHG | OXYGEN SATURATION: 94 % | SYSTOLIC BLOOD PRESSURE: 120 MMHG | HEART RATE: 102 BPM | BODY MASS INDEX: 32.64 KG/M2 | WEIGHT: 221 LBS

## 2025-09-09 DIAGNOSIS — Z95.1 PRESENCE OF AORTOCORONARY BYPASS GRAFT: ICD-10-CM

## 2025-09-09 DIAGNOSIS — C61 MALIGNANT NEOPLASM OF PROSTATE: ICD-10-CM

## 2025-09-09 DIAGNOSIS — K62.7 RADIATION PROCTITIS: ICD-10-CM

## 2025-09-09 DIAGNOSIS — R06.02 SHORTNESS OF BREATH: ICD-10-CM

## 2025-09-09 DIAGNOSIS — N30.40 IRRADIATION CYSTITIS W/OUT HEMATURIA: ICD-10-CM

## 2025-09-09 DIAGNOSIS — I44.7 LEFT BUNDLE-BRANCH BLOCK, UNSPECIFIED: ICD-10-CM

## 2025-09-09 DIAGNOSIS — Z95.0 PRESENCE OF CARDIAC PACEMAKER: ICD-10-CM

## 2025-09-09 DIAGNOSIS — Z95.3 PRESENCE OF XENOGENIC HEART VALVE: ICD-10-CM

## 2025-09-09 DIAGNOSIS — K21.9 GASTRO-ESOPHAGEAL REFLUX DISEASE W/OUT ESOPHAGITIS: ICD-10-CM

## 2025-09-09 PROCEDURE — G2211 COMPLEX E/M VISIT ADD ON: CPT

## 2025-09-09 PROCEDURE — 93000 ELECTROCARDIOGRAM COMPLETE: CPT

## 2025-09-09 PROCEDURE — 99214 OFFICE O/P EST MOD 30 MIN: CPT

## 2025-09-09 RX ORDER — CARBIDOPA AND LEVODOPA 25; 100 MG/1; MG/1
25-100 TABLET ORAL
Refills: 0 | Status: ACTIVE | COMMUNITY

## 2025-09-18 ENCOUNTER — APPOINTMENT (OUTPATIENT)
Dept: CARDIOLOGY | Facility: CLINIC | Age: 80
End: 2025-09-18

## 2025-09-19 DIAGNOSIS — L21.9 SEBORRHEIC DERMATITIS, UNSPECIFIED: ICD-10-CM

## 2025-09-19 RX ORDER — KETOCONAZOLE 20 MG/ML
2 SHAMPOO TOPICAL
Qty: 1 | Refills: 0 | Status: ACTIVE | COMMUNITY
Start: 2025-09-19 | End: 1900-01-01

## (undated) DEVICE — DRAPE TOWEL BLUE 17" X 24"

## (undated) DEVICE — SUT SOFSILK 0 30" V-20

## (undated) DEVICE — GLV 7.5 PROTEXIS (WHITE)

## (undated) DEVICE — SPECIMEN CONTAINER 100ML

## (undated) DEVICE — VESSEL LOOP EXTRA MAXI-BLUE 0.200" X 22"

## (undated) DEVICE — DRAIN PLEUROVAC CHEST DRAINAGE PEDI

## (undated) DEVICE — Device

## (undated) DEVICE — GLV 8 PROTEXIS (WHITE)

## (undated) DEVICE — SOL IRR POUR H2O 250ML

## (undated) DEVICE — GLV 7 PROTEXIS (WHITE)

## (undated) DEVICE — SUT PROLENE 5-0 36" RB-1

## (undated) DEVICE — SUT BIOSYN 4-0 18" P-12

## (undated) DEVICE — STEALTH CLAMP INSERT FIBRA/FIBRA 90MM

## (undated) DEVICE — SOL IRR POUR NS 0.9% 500ML

## (undated) DEVICE — GOWN XXXL

## (undated) DEVICE — PACK UNIVERSAL CARDIAC

## (undated) DEVICE — SAW BLADE MICROAIRE STERNUM 1.1X50X42MM

## (undated) DEVICE — DRSG OPSITE 13.75 X 4"

## (undated) DEVICE — POSITIONER FOAM EGG CRATE ULNAR 2PCS (PINK)

## (undated) DEVICE — WARMING BLANKET FULL UNDERBODY

## (undated) DEVICE — SUT PROLENE 4-0 36" BB

## (undated) DEVICE — DRAIN PLEUROVAC

## (undated) DEVICE — WARMING BLANKET DUO-THERM HYPER/HYPOTHERM ADULT

## (undated) DEVICE — PACK CARDIAC YELLOW

## (undated) DEVICE — ELCTR BOVIE PENCIL HANDPIECE ROCKER SWITCH 15FT

## (undated) DEVICE — DRSG TEGADERM 6"X8"

## (undated) DEVICE — MEDICATION LABELS W MARKER

## (undated) DEVICE — DRSG STERISTRIPS 0.5 X 4"

## (undated) DEVICE — NDL COUNTER FOAM AND MAGNET 40-70

## (undated) DEVICE — DRSG OPSITE 2.5 X 2"

## (undated) DEVICE — ELCTR GROUNDING PAD ADULT COVIDIEN

## (undated) DEVICE — DRAPE LIGHT HANDLE COVER (BLUE)

## (undated) DEVICE — SAW BLADE MICROAIRE STERNUM 1X34X9.4MM

## (undated) DEVICE — ELCTR REM POLYHESIVE ADULT PT RETURN 15FT

## (undated) DEVICE — DRAPE 3/4 SHEET W REINFORCEMENT 56X77"

## (undated) DEVICE — DEFIBRILLATOR PAD PRE-CONNECT ADULT/CHILD

## (undated) DEVICE — SUT PROLENE 3-0 36" SH

## (undated) DEVICE — SUT POLYSORB 1 36" GS-25

## (undated) DEVICE — DRAPE IOBAN 23" X 23"

## (undated) DEVICE — SUT PROLENE 4-0 36" SH

## (undated) DEVICE — TUBING SUCTION 20FT

## (undated) DEVICE — FOLEY TRAY 16FR 5CC LF LUBRISIL ADVANCE TEMP CLOSED

## (undated) DEVICE — DRAPE INSTRUMENT POUCH 6.75" X 11"

## (undated) DEVICE — SUT SOFSILK 2 60" TIES

## (undated) DEVICE — GLV 6.5 PROTEXIS (WHITE)

## (undated) DEVICE — MNFLD SETUP

## (undated) DEVICE — GLV 8.5 PROTEXIS (WHITE)

## (undated) DEVICE — PACING CABLE TEMP MEDTRONIC WITH PAC-LOC

## (undated) DEVICE — SUT PLEDGET PRE PUNCH 4.8 X 9.5 X 1.5 MM

## (undated) DEVICE — SUT PROLENE 5-0 30" RB-2

## (undated) DEVICE — SUT ETHIBOND 2-0 36" SH

## (undated) DEVICE — DRAPE C ARM UNIVERSAL

## (undated) DEVICE — SOL NORMOSOL-R PH7.4 1000ML

## (undated) DEVICE — SYR ASEPTO